# Patient Record
Sex: MALE | Race: WHITE | NOT HISPANIC OR LATINO
[De-identification: names, ages, dates, MRNs, and addresses within clinical notes are randomized per-mention and may not be internally consistent; named-entity substitution may affect disease eponyms.]

---

## 2018-03-27 ENCOUNTER — RESULT REVIEW (OUTPATIENT)
Age: 63
End: 2018-03-27

## 2018-05-22 ENCOUNTER — EMERGENCY (EMERGENCY)
Facility: HOSPITAL | Age: 63
LOS: 0 days | Discharge: ANOTHER HOSPITAL | End: 2018-05-23
Attending: EMERGENCY MEDICINE | Admitting: EMERGENCY MEDICINE
Payer: COMMERCIAL

## 2018-05-22 VITALS
SYSTOLIC BLOOD PRESSURE: 135 MMHG | TEMPERATURE: 98 F | HEART RATE: 86 BPM | DIASTOLIC BLOOD PRESSURE: 81 MMHG | OXYGEN SATURATION: 98 % | RESPIRATION RATE: 16 BRPM | WEIGHT: 162.92 LBS

## 2018-05-22 LAB
ALBUMIN SERPL ELPH-MCNC: 3.9 G/DL — SIGNIFICANT CHANGE UP (ref 3.3–5)
ALP SERPL-CCNC: 60 U/L — SIGNIFICANT CHANGE UP (ref 40–120)
ALT FLD-CCNC: 26 U/L — SIGNIFICANT CHANGE UP (ref 12–78)
ANION GAP SERPL CALC-SCNC: 8 MMOL/L — SIGNIFICANT CHANGE UP (ref 5–17)
APPEARANCE UR: CLEAR — SIGNIFICANT CHANGE UP
APTT BLD: 32.2 SEC — SIGNIFICANT CHANGE UP (ref 27.5–37.4)
AST SERPL-CCNC: 23 U/L — SIGNIFICANT CHANGE UP (ref 15–37)
BASOPHILS # BLD AUTO: 0.02 K/UL — SIGNIFICANT CHANGE UP (ref 0–0.2)
BASOPHILS NFR BLD AUTO: 0.3 % — SIGNIFICANT CHANGE UP (ref 0–2)
BILIRUB SERPL-MCNC: 0.4 MG/DL — SIGNIFICANT CHANGE UP (ref 0.2–1.2)
BILIRUB UR-MCNC: NEGATIVE — SIGNIFICANT CHANGE UP
BUN SERPL-MCNC: 16 MG/DL — SIGNIFICANT CHANGE UP (ref 7–23)
CALCIUM SERPL-MCNC: 8.6 MG/DL — SIGNIFICANT CHANGE UP (ref 8.5–10.1)
CHLORIDE SERPL-SCNC: 112 MMOL/L — HIGH (ref 96–108)
CO2 SERPL-SCNC: 23 MMOL/L — SIGNIFICANT CHANGE UP (ref 22–31)
COLOR SPEC: YELLOW — SIGNIFICANT CHANGE UP
CREAT SERPL-MCNC: 1.03 MG/DL — SIGNIFICANT CHANGE UP (ref 0.5–1.3)
DIFF PNL FLD: NEGATIVE — SIGNIFICANT CHANGE UP
EOSINOPHIL # BLD AUTO: 0.23 K/UL — SIGNIFICANT CHANGE UP (ref 0–0.5)
EOSINOPHIL NFR BLD AUTO: 3.3 % — SIGNIFICANT CHANGE UP (ref 0–6)
GLUCOSE SERPL-MCNC: 109 MG/DL — HIGH (ref 70–99)
GLUCOSE UR QL: NEGATIVE MG/DL — SIGNIFICANT CHANGE UP
HCT VFR BLD CALC: 43.7 % — SIGNIFICANT CHANGE UP (ref 39–50)
HGB BLD-MCNC: 14.9 G/DL — SIGNIFICANT CHANGE UP (ref 13–17)
IMM GRANULOCYTES NFR BLD AUTO: 0.3 % — SIGNIFICANT CHANGE UP (ref 0–1.5)
INR BLD: 1.12 RATIO — SIGNIFICANT CHANGE UP (ref 0.88–1.16)
KETONES UR-MCNC: NEGATIVE — SIGNIFICANT CHANGE UP
LEUKOCYTE ESTERASE UR-ACNC: (no result)
LIDOCAIN IGE QN: 106 U/L — SIGNIFICANT CHANGE UP (ref 73–393)
LYMPHOCYTES # BLD AUTO: 1.47 K/UL — SIGNIFICANT CHANGE UP (ref 1–3.3)
LYMPHOCYTES # BLD AUTO: 21.1 % — SIGNIFICANT CHANGE UP (ref 13–44)
MCHC RBC-ENTMCNC: 29.6 PG — SIGNIFICANT CHANGE UP (ref 27–34)
MCHC RBC-ENTMCNC: 34.1 GM/DL — SIGNIFICANT CHANGE UP (ref 32–36)
MCV RBC AUTO: 86.9 FL — SIGNIFICANT CHANGE UP (ref 80–100)
MONOCYTES # BLD AUTO: 0.58 K/UL — SIGNIFICANT CHANGE UP (ref 0–0.9)
MONOCYTES NFR BLD AUTO: 8.3 % — SIGNIFICANT CHANGE UP (ref 2–14)
NEUTROPHILS # BLD AUTO: 4.65 K/UL — SIGNIFICANT CHANGE UP (ref 1.8–7.4)
NEUTROPHILS NFR BLD AUTO: 66.7 % — SIGNIFICANT CHANGE UP (ref 43–77)
NITRITE UR-MCNC: NEGATIVE — SIGNIFICANT CHANGE UP
NRBC # BLD: 0 /100 WBCS — SIGNIFICANT CHANGE UP (ref 0–0)
PH UR: 7 — SIGNIFICANT CHANGE UP (ref 5–8)
PLATELET # BLD AUTO: 180 K/UL — SIGNIFICANT CHANGE UP (ref 150–400)
POTASSIUM SERPL-MCNC: 3.8 MMOL/L — SIGNIFICANT CHANGE UP (ref 3.5–5.3)
POTASSIUM SERPL-SCNC: 3.8 MMOL/L — SIGNIFICANT CHANGE UP (ref 3.5–5.3)
PROT SERPL-MCNC: 6.8 GM/DL — SIGNIFICANT CHANGE UP (ref 6–8.3)
PROT UR-MCNC: NEGATIVE MG/DL — SIGNIFICANT CHANGE UP
PROTHROM AB SERPL-ACNC: 12.1 SEC — SIGNIFICANT CHANGE UP (ref 9.8–12.7)
RBC # BLD: 5.03 M/UL — SIGNIFICANT CHANGE UP (ref 4.2–5.8)
RBC # FLD: 13.2 % — SIGNIFICANT CHANGE UP (ref 10.3–14.5)
RBC CASTS # UR COMP ASSIST: SIGNIFICANT CHANGE UP /HPF (ref 0–4)
SODIUM SERPL-SCNC: 143 MMOL/L — SIGNIFICANT CHANGE UP (ref 135–145)
SP GR SPEC: 1.01 — SIGNIFICANT CHANGE UP (ref 1.01–1.02)
UROBILINOGEN FLD QL: NEGATIVE MG/DL — SIGNIFICANT CHANGE UP
WBC # BLD: 6.97 K/UL — SIGNIFICANT CHANGE UP (ref 3.8–10.5)
WBC # FLD AUTO: 6.97 K/UL — SIGNIFICANT CHANGE UP (ref 3.8–10.5)
WBC UR QL: SIGNIFICANT CHANGE UP

## 2018-05-22 PROCEDURE — 99285 EMERGENCY DEPT VISIT HI MDM: CPT

## 2018-05-22 PROCEDURE — 99282 EMERGENCY DEPT VISIT SF MDM: CPT

## 2018-05-22 RX ORDER — SODIUM CHLORIDE 9 MG/ML
1000 INJECTION INTRAMUSCULAR; INTRAVENOUS; SUBCUTANEOUS ONCE
Qty: 0 | Refills: 0 | Status: DISCONTINUED | OUTPATIENT
Start: 2018-05-22 | End: 2018-05-23

## 2018-05-22 RX ORDER — ONDANSETRON 8 MG/1
4 TABLET, FILM COATED ORAL ONCE
Qty: 0 | Refills: 0 | Status: COMPLETED | OUTPATIENT
Start: 2018-05-22 | End: 2018-05-22

## 2018-05-22 RX ORDER — SODIUM CHLORIDE 9 MG/ML
1000 INJECTION INTRAMUSCULAR; INTRAVENOUS; SUBCUTANEOUS ONCE
Qty: 0 | Refills: 0 | Status: COMPLETED | OUTPATIENT
Start: 2018-05-22 | End: 2018-05-22

## 2018-05-22 RX ADMIN — ONDANSETRON 4 MILLIGRAM(S): 8 TABLET, FILM COATED ORAL at 19:24

## 2018-05-22 RX ADMIN — SODIUM CHLORIDE 1000 MILLILITER(S): 9 INJECTION INTRAMUSCULAR; INTRAVENOUS; SUBCUTANEOUS at 19:24

## 2018-05-22 NOTE — CONSULT NOTE ADULT - ASSESSMENT
A/P:  SBO/PSBO  S/P liver transplant at [facility]  Advise NPO  Pt requires tertiary care center surgical management not available at [facility]  Advise pt transfer to appropriate tertiary care center for definitive management and care, pt [facility] surgeon; advise transfer to appropriate tertiary care transplant center   All of the above relayed to pt and to ER staff

## 2018-05-22 NOTE — ED STATDOCS - PROGRESS NOTE DETAILS
Sign out from resident Francisco Javier:  She discussed case with Dr. Voss who is the on call surgeon today.  He is currently in surgery, awaiting call back.  Patient is aware he will be admitted. -Rika Su PA-C Outpatient CT done at [facility], official read brought in by patient but disc was left at home.  Impression:  Partial SBO with abnormal thick walled small bowel loop left lwoer quadrant pelvis.  Differential diagnosis includes ischemic bowl resulting in partial SBO.  Other diagnostic considerations include postoperative adhesions.  Neoplasm is less likely.  Clinical correlation is suggested.  Status post liver transplantation.  moderate prostatic enlargement Dr. Voss evaluated patient.  He recommends patient be transferred to [facility] where he had his surgery.  Message left with the answering service of Dr. Devlin for return call -Rika Su PA-C Transfer set up through [facility] transfer line by Dr. Frances.  The receiving physician at [facility] is Dr. Egan.   He is going to bed number 110. The reason for transfer is for care from transplant specialists.  Patient agreeable to transfer.  He took oral doses of his nighttime anti rejection medications that he brought from home here.  Tolerated.  Otherwise is on bowel rest without an NG tube in place.  Patient is awaiting transfer, comfortable and is stable -Rika Su PA-C

## 2018-05-22 NOTE — ED STATDOCS - OBJECTIVE STATEMENT
63 y/o Male with a PMHx of Liver transplant in 2011 due to EtOH presents to ED c/o abd pain x 2 days. +Nausea. Pt notes of very little BM recently, tried to treat with laxative with no relief of Sx. No blood in stool, no fever. Pt visited PMD who referred to [facility] for outpatient CT, pt was called and notified of partial SBO and told to go to ED. PMD Charan. [facility].

## 2018-05-22 NOTE — ED ADULT TRIAGE NOTE - CHIEF COMPLAINT QUOTE
pt sent to ED by Dr. jimenez for partial small bowel obstruction, pt c/o abdominal pain for two days, no NVD, no fever, no hx of SBO, pt has hx of liver transplant in 2011

## 2018-05-22 NOTE — CONSULT NOTE ADULT - SUBJECTIVE AND OBJECTIVE BOX
CC:Patient is a 62y old  Male who presents with a chief complaint of abd pain since 5/20/18    Subjective:  Pt seen and examined at bedside with chaperone. Pt is AAOx3, pt in no acute distress. Pt has c/o abd pain, central, non radiating sine 5/20/18.  Pt states scant flatus x 1 day. Pt denied c/o fever, chills, chest pain, SOB, N/V/D, extremity pain or dysfunction, hemoptysis, hematemesis, hematuria, hematochexia, headache, diplopia, vertigo, dizzyness. Pt s/p liver transplant at [facility] 2011.    ROS:  abd pain, otherwise negative    PMH: s/p liver transplant, h/o h/o etoh abuse in past, HTN  PSH: liver transplant, defibrillator  Allergies: NKDA  SH: no etoh, tobacco, illicit drug use  FH: father had CAD    Vital Signs Last 24 Hrs  T(C): 36.5 (22 May 2018 18:32), Max: 36.5 (22 May 2018 18:32)  T(F): 97.7 (22 May 2018 18:32), Max: 97.7 (22 May 2018 18:32)  HR: 86 (22 May 2018 18:32) (86 - 86)  BP: 135/81 (22 May 2018 18:32) (135/81 - 135/81)  BP(mean): --  RR: 16 (22 May 2018 18:32) (16 - 16)  SpO2: 98% (22 May 2018 18:32) (98% - 98%)    Labs:                      14.9   6.97  )-----------( 180      ( 22 May 2018 19:09 )             43.7     CBC Full  -  ( 22 May 2018 19:09 )  WBC Count : 6.97 K/uL  Hemoglobin : 14.9 g/dL  Hematocrit : 43.7 %  Platelet Count - Automated : 180 K/uL  Mean Cell Volume : 86.9 fl  Mean Cell Hemoglobin : 29.6 pg  Mean Cell Hemoglobin Concentration : 34.1 gm/dL  Auto Neutrophil # : 4.65 K/uL  Auto Lymphocyte # : 1.47 K/uL  Auto Monocyte # : 0.58 K/uL  Auto Eosinophil # : 0.23 K/uL  Auto Basophil # : 0.02 K/uL  Auto Neutrophil % : 66.7 %  Auto Lymphocyte % : 21.1 %  Auto Monocyte % : 8.3 %  Auto Eosinophil % : 3.3 %  Auto Basophil % : 0.3 %    05-22    143  |  112<H>  |  16  ----------------------------<  109<H>  3.8   |  23  |  1.03    Ca    8.6      22 May 2018 19:09    TPro  6.8  /  Alb  3.9  /  TBili  0.4  /  DBili  x   /  AST  23  /  ALT  26  /  AlkPhos  60  05-22    LIVER FUNCTIONS - ( 22 May 2018 19:09 )  Alb: 3.9 g/dL / Pro: 6.8 gm/dL / ALK PHOS: 60 U/L / ALT: 26 U/L / AST: 23 U/L / GGT: x           PT/INR - ( 22 May 2018 19:09 )   PT: 12.1 sec;   INR: 1.12 ratio         PTT - ( 22 May 2018 19:09 )  PTT:32.2 sec      Meds:      Radiology:  Outside radiologic study [facility], 5/22/18  CT abd/pelvis: Partial small bowel obstruction with abnormal thick walled small bowel loop left lower quadrant/pelvis. Differential diagnosis includes ischemic bowel resulting in partial small bowel obstruction. Other diagnostic considerations include postoperative adhesions. Neoplasm less likely. S/P liver transplantation. Moderate prostatic enlargement    Physical exam:  Pt is AAOx3  Pt in no acute distress  CNII-XII grossly intact   HEENT: Normocephalic, atraumatic, BASSEM, EOM wnl  Neck: No crepitus, no ecchymosis, no hematoma, to exam, no JVD, no tracheal deviation  Cardiovascular: S1S2 Present  Respiratory: Respiratory Effort normal; no wheezes, rales or rhonchi to exam, CTAB  ABD: bowel sounds (+), soft, (+) tenderness to exam of lower abdomen, no pain out of proportion to exam, mildly distended, no rebound, no guarding, no rigidity, no skin changes to exam. No pelvic instability to exam, no skin changes  Musculoskeletal: All digits are warm and well perfused. Pt demonstrates grossly intact sensoromotor function. Pt has good capillary refill to digits, no calf edema or tenderness to exam.  Skin: no jaundice or icteric sclera to exam b/l, no skin changes to exam

## 2018-05-22 NOTE — ED ADULT NURSE NOTE - OBJECTIVE STATEMENT
Pt presented to ED c/o abd pain. As per pt, pt had a sudden onset of right mid abd since Sunday which worsen. The pain comes in waves, sharp, nonradiating. C/O occasional nausea w/o vomiting. Been having BM but very small quantity as per pt. Hx Liver transplant in 2011. No longer drinks. Pt noted to have distended abd w/ tenderness to mid abd. + Bowel sound. Hyperactive.

## 2018-05-22 NOTE — ED STATDOCS - ATTENDING CONTRIBUTION TO CARE
I, Jose Frances, performed the initial face to face bedside interview with this patient regarding history of present illness, review of symptoms and relevant past medical, social and family history.  I completed an independent physical examination.  I was the initial provider who evaluated this patient. I have signed out the follow up of any pending tests (i.e. labs, radiological studies) to the ACP.  I have communicated the patient’s plan of care and disposition with the ACP.  The history, relevant review of systems, past medical and surgical history, medical decision making, and physical examination was documented by the scribe in my presence and I attest to the accuracy of the documentation.

## 2018-05-22 NOTE — ED PEDIATRIC NURSE REASSESSMENT NOTE - NS ED NURSE REASSESS COMMENT FT2
Patient to be transferred to [facility].  Report given to transfer center.  IVF NS infusing at this time.  VS stable as charted.  Patient is stable on his feet.  Will continue to monitor.

## 2018-05-22 NOTE — ED ADULT NURSE NOTE - CHPI ED SYMPTOMS NEG
no numbness/no tingling/no vomiting/no decreased eating/drinking/no chills/no dizziness/no fever/no weakness

## 2018-05-23 VITALS
OXYGEN SATURATION: 98 % | RESPIRATION RATE: 16 BRPM | DIASTOLIC BLOOD PRESSURE: 74 MMHG | TEMPERATURE: 98 F | HEART RATE: 74 BPM | SYSTOLIC BLOOD PRESSURE: 122 MMHG

## 2018-05-23 LAB
CULTURE RESULTS: NO GROWTH — SIGNIFICANT CHANGE UP
SPECIMEN SOURCE: SIGNIFICANT CHANGE UP

## 2018-05-23 NOTE — ED ADULT NURSE REASSESSMENT NOTE - NS ED NURSE REASSESS COMMENT FT1
Patient transferred at this time by [facility] to [facility] with 2 EMT's.  VS stable as charted at this time. Color pink, skin warm and dry.  Right forearm IV intact with no signs of redness swelling or infiltration noted.

## 2018-05-25 DIAGNOSIS — Z94.4 LIVER TRANSPLANT STATUS: ICD-10-CM

## 2018-05-25 DIAGNOSIS — K56.609 UNSPECIFIED INTESTINAL OBSTRUCTION, UNSPECIFIED AS TO PARTIAL VERSUS COMPLETE OBSTRUCTION: ICD-10-CM

## 2018-06-02 ENCOUNTER — EMERGENCY (EMERGENCY)
Facility: HOSPITAL | Age: 63
LOS: 0 days | Discharge: HOME | End: 2018-06-03
Attending: EMERGENCY MEDICINE | Admitting: EMERGENCY MEDICINE
Payer: MEDICARE

## 2018-06-02 VITALS
DIASTOLIC BLOOD PRESSURE: 98 MMHG | RESPIRATION RATE: 18 BRPM | HEIGHT: 70 IN | HEART RATE: 79 BPM | OXYGEN SATURATION: 95 % | TEMPERATURE: 97 F | SYSTOLIC BLOOD PRESSURE: 150 MMHG | WEIGHT: 162.92 LBS

## 2018-06-02 DIAGNOSIS — S01.551A OPEN BITE OF LIP, INITIAL ENCOUNTER: ICD-10-CM
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
## 2018-06-02 PROCEDURE — 99284 EMERGENCY DEPT VISIT MOD MDM: CPT

## 2018-06-02 NOTE — ED ADULT TRIAGE NOTE - CHIEF COMPLAINT QUOTE
pt was bit by dog on right side of face. Dog is owned by patient and is up to date on vaccinations. Pt had tetanus shot a few months ago

## 2018-06-03 RX ORDER — TACROLIMUS 5 MG/1
0 CAPSULE ORAL
Qty: 0 | Refills: 0 | COMMUNITY

## 2018-06-03 RX ADMIN — Medication 1 TABLET(S): at 01:09

## 2018-06-03 NOTE — ED PROVIDER NOTE - CARE PLAN
Principal Discharge DX:	Dog bite, initial encounter  Secondary Diagnosis:	Lip laceration, initial encounter

## 2018-06-03 NOTE — ED ADULT NURSE REASSESSMENT NOTE - NS ED NURSE REASSESS COMMENT FT1
patient discharged home. written and verbal discharge, prescription, and followup instructions given to patient, patient verbalized back understanding. discharged home with wife at 0310.

## 2018-06-03 NOTE — ED ADULT NURSE NOTE - OBJECTIVE STATEMENT
s/p dog bite by own dog (jackl deandra terrier) to right cheek. states dog's vaccinations utd. states tetanus utd

## 2018-06-03 NOTE — ED PROVIDER NOTE - PROGRESS NOTE DETAILS
case d/w Dr Leblanc (plastics) and will abran pt evaluated and sutured by Dr Leblanc and recommend augmentin and f/u in office

## 2018-06-03 NOTE — ED PROVIDER NOTE - OBJECTIVE STATEMENT
61 y/o male in ED with spouse c/o dog bite to face by his own dog x 1 hr.   pt states that he was attempting to move his dog on the dog bed when she awoke and bit him.  states lac to right upper lip.  pt denies any other complaints.  states last tetanus 2-3 months ago.  states dog UTD with shots.

## 2019-05-23 PROBLEM — Z94.4 LIVER TRANSPLANT STATUS: Chronic | Status: ACTIVE | Noted: 2018-05-23

## 2019-05-28 ENCOUNTER — TRANSCRIPTION ENCOUNTER (OUTPATIENT)
Age: 64
End: 2019-05-28

## 2019-05-29 ENCOUNTER — OUTPATIENT (OUTPATIENT)
Dept: OUTPATIENT SERVICES | Facility: HOSPITAL | Age: 64
LOS: 1 days | End: 2019-05-29
Payer: MEDICARE

## 2019-05-29 VITALS
TEMPERATURE: 98 F | WEIGHT: 156.09 LBS | DIASTOLIC BLOOD PRESSURE: 73 MMHG | HEIGHT: 68 IN | OXYGEN SATURATION: 97 % | HEART RATE: 75 BPM | RESPIRATION RATE: 17 BRPM | SYSTOLIC BLOOD PRESSURE: 135 MMHG

## 2019-05-29 VITALS
OXYGEN SATURATION: 97 % | SYSTOLIC BLOOD PRESSURE: 127 MMHG | RESPIRATION RATE: 14 BRPM | HEART RATE: 79 BPM | DIASTOLIC BLOOD PRESSURE: 87 MMHG

## 2019-05-29 DIAGNOSIS — Z98.890 OTHER SPECIFIED POSTPROCEDURAL STATES: Chronic | ICD-10-CM

## 2019-05-29 DIAGNOSIS — Z94.4 LIVER TRANSPLANT STATUS: Chronic | ICD-10-CM

## 2019-05-29 DIAGNOSIS — Z95.0 PRESENCE OF CARDIAC PACEMAKER: Chronic | ICD-10-CM

## 2019-05-29 DIAGNOSIS — H25.10 AGE-RELATED NUCLEAR CATARACT, UNSPECIFIED EYE: ICD-10-CM

## 2019-05-29 PROCEDURE — 66984 XCAPSL CTRC RMVL W/O ECP: CPT | Mod: RT

## 2019-05-29 PROCEDURE — V2632: CPT

## 2019-05-29 NOTE — ASU PATIENT PROFILE, ADULT - PMH
CHF (congestive heart failure)    Cirrhosis of liver    Hepatitis C    HLD (hyperlipidemia)    Liver transplant recipient    Syncope, vasovagal    Valvular heart disease    Ventricular tachycardia, paroxysmal

## 2019-05-29 NOTE — ASU DISCHARGE PLAN (ADULT/PEDIATRIC) - CALL YOUR DOCTOR IF YOU HAVE ANY OF THE FOLLOWING:
Swelling that gets worse/Fever greater than (need to indicate Fahrenheit or Celsius)/Nausea and vomiting that does not stop/Bleeding that does not stop/Pain not relieved by Medications

## 2019-05-29 NOTE — ASU DISCHARGE PLAN (ADULT/PEDIATRIC) - CARE PROVIDER_API CALL
Kevin Sims)  Ophthalmology  [address] [facility], [address] [place] [address]  Phone: [phone]  Fax: [phone]  Follow Up Time:

## 2019-05-29 NOTE — ASU DISCHARGE PLAN (ADULT/PEDIATRIC) - PATIENT EDUCATION MATERIALS PROVIED
Implant card (specify)/Intraocular lens implant (IOL) , Eye shield instructions and eye kit given to patient/Other (specify)

## 2019-05-29 NOTE — ASU PATIENT PROFILE, ADULT - PSH
H/O bilateral inguinal hernia repair    H/O liver transplant    H/O umbilical hernia repair    Pacemaker  Removal of pacemaker /AICD [id number] still in place

## 2019-05-29 NOTE — ASU DISCHARGE PLAN (ADULT/PEDIATRIC) - NURSING INSTRUCTIONS
Do not rub the eye. Tylenol or extra strength tylenol if needed for discomfort, avoid   Advil, Motrin, Aleve and aspirin to minimize bleeding. Appointment with Dr. Sims on 5/30/19

## 2019-06-10 PROBLEM — E78.5 HYPERLIPIDEMIA, UNSPECIFIED: Chronic | Status: ACTIVE | Noted: 2019-05-29

## 2019-06-10 PROBLEM — I38 ENDOCARDITIS, VALVE UNSPECIFIED: Chronic | Status: ACTIVE | Noted: 2019-05-29

## 2019-06-10 PROBLEM — B19.20 UNSPECIFIED VIRAL HEPATITIS C WITHOUT HEPATIC COMA: Chronic | Status: ACTIVE | Noted: 2019-05-29

## 2019-06-10 PROBLEM — K74.60 UNSPECIFIED CIRRHOSIS OF LIVER: Chronic | Status: ACTIVE | Noted: 2019-05-29

## 2019-06-10 PROBLEM — R55 SYNCOPE AND COLLAPSE: Chronic | Status: ACTIVE | Noted: 2019-05-29

## 2019-06-10 PROBLEM — I50.9 HEART FAILURE, UNSPECIFIED: Chronic | Status: ACTIVE | Noted: 2019-05-29

## 2019-06-10 PROBLEM — I47.2 VENTRICULAR TACHYCARDIA: Chronic | Status: ACTIVE | Noted: 2019-05-29

## 2019-06-11 ENCOUNTER — TRANSCRIPTION ENCOUNTER (OUTPATIENT)
Age: 64
End: 2019-06-11

## 2019-06-12 ENCOUNTER — OUTPATIENT (OUTPATIENT)
Dept: OUTPATIENT SERVICES | Facility: HOSPITAL | Age: 64
LOS: 1 days | End: 2019-06-12
Payer: MEDICARE

## 2019-06-12 VITALS
RESPIRATION RATE: 18 BRPM | OXYGEN SATURATION: 98 % | DIASTOLIC BLOOD PRESSURE: 76 MMHG | SYSTOLIC BLOOD PRESSURE: 118 MMHG | HEART RATE: 72 BPM

## 2019-06-12 VITALS
RESPIRATION RATE: 15 BRPM | OXYGEN SATURATION: 95 % | SYSTOLIC BLOOD PRESSURE: 123 MMHG | HEART RATE: 77 BPM | DIASTOLIC BLOOD PRESSURE: 84 MMHG | TEMPERATURE: 98 F | HEIGHT: 68 IN | WEIGHT: 154.32 LBS

## 2019-06-12 DIAGNOSIS — Z98.890 OTHER SPECIFIED POSTPROCEDURAL STATES: Chronic | ICD-10-CM

## 2019-06-12 DIAGNOSIS — H25.10 AGE-RELATED NUCLEAR CATARACT, UNSPECIFIED EYE: ICD-10-CM

## 2019-06-12 DIAGNOSIS — Z98.49 CATARACT EXTRACTION STATUS, UNSPECIFIED EYE: Chronic | ICD-10-CM

## 2019-06-12 DIAGNOSIS — Z95.0 PRESENCE OF CARDIAC PACEMAKER: Chronic | ICD-10-CM

## 2019-06-12 DIAGNOSIS — Z94.4 LIVER TRANSPLANT STATUS: Chronic | ICD-10-CM

## 2019-06-12 PROCEDURE — 66984 XCAPSL CTRC RMVL W/O ECP: CPT | Mod: LT

## 2019-06-12 PROCEDURE — V2632: CPT

## 2019-06-12 NOTE — ASU PATIENT PROFILE, ADULT - PSH
H/O bilateral inguinal hernia repair    H/O cataract removal with insertion of prosthetic lens  right eye  H/O liver transplant    H/O umbilical hernia repair    Pacemaker  Removal of pacemaker /AICD [id number] still in place

## 2019-06-12 NOTE — ASU DISCHARGE PLAN (ADULT/PEDIATRIC) - CALL YOUR DOCTOR IF YOU HAVE ANY OF THE FOLLOWING:
Swelling that gets worse/Nausea and vomiting that does not stop/Bleeding that does not stop/Pain not relieved by Medications/Wound/Surgical Site with redness, or foul smelling discharge or pus

## 2019-06-12 NOTE — ASU DISCHARGE PLAN (ADULT/PEDIATRIC) - CARE PROVIDER_API CALL
Kevin Sims)  Ophthalmology  [address] [facility], [address] [place] [address]  Phone: [phone]  Fax: [phone]  Follow Up Time:

## 2019-10-11 ENCOUNTER — APPOINTMENT (OUTPATIENT)
Dept: GASTROENTEROLOGY | Facility: CLINIC | Age: 64
End: 2019-10-11
Payer: MEDICARE

## 2019-10-11 VITALS
BODY MASS INDEX: 23.7 KG/M2 | WEIGHT: 160 LBS | DIASTOLIC BLOOD PRESSURE: 96 MMHG | HEART RATE: 83 BPM | SYSTOLIC BLOOD PRESSURE: 142 MMHG | HEIGHT: 69 IN

## 2019-10-11 DIAGNOSIS — Z87.891 PERSONAL HISTORY OF NICOTINE DEPENDENCE: ICD-10-CM

## 2019-10-11 DIAGNOSIS — R10.9 UNSPECIFIED ABDOMINAL PAIN: ICD-10-CM

## 2019-10-11 DIAGNOSIS — R63.4 ABNORMAL WEIGHT LOSS: ICD-10-CM

## 2019-10-11 PROCEDURE — 99213 OFFICE O/P EST LOW 20 MIN: CPT

## 2019-10-11 NOTE — PHYSICAL EXAM
[General Appearance - Alert] : alert [Sclera] : the sclera and conjunctiva were normal [PERRL With Normal Accommodation] : pupils were equal in size, round, and reactive to light [Extraocular Movements] : extraocular movements were intact [Heart Rate And Rhythm] : heart rate was normal and rhythm regular [Auscultation Breath Sounds / Voice Sounds] : lungs were clear to auscultation bilaterally [Heart Sounds] : normal S1 and S2 [Heart Sounds Gallop] : no gallops [Heart Sounds Pericardial Friction Rub] : no pericardial rub [Murmurs] : no murmurs [Abdomen Soft] : soft [Bowel Sounds] : normal bowel sounds [FreeTextEntry1] : mild epigastric tenderness.  [Abdomen Mass (___ Cm)] : no abdominal mass palpated [] : no hepato-splenomegaly [No CVA Tenderness] : no ~M costovertebral angle tenderness [No Spinal Tenderness] : no spinal tenderness [Abnormal Walk] : normal gait [Nail Clubbing] : no clubbing  or cyanosis of the fingernails [Musculoskeletal - Swelling] : no joint swelling seen [Motor Tone] : muscle strength and tone were normal [Impaired Insight] : insight and judgment were intact [Oriented To Time, Place, And Person] : oriented to person, place, and time [Affect] : the affect was normal

## 2019-10-11 NOTE — ASSESSMENT
[FreeTextEntry1] : 65 y/o male with hx of etoh cirrhosis s/p liver transplant in 2013 on prograft.  Now with epigastric pain and weight loss of unknown etiology and new diagnose of prostate cancer.  Obtain ct scan, plan for egd and start ppi. \par Discussed with Dr. Adler.

## 2019-10-11 NOTE — HISTORY OF PRESENT ILLNESS
[de-identified] : 63 y/o  male with hx of etoh cirrhosis s/p liver transplant in 2013 on prograft now with abdominal pain and weight loss over the past few weeks.  Pt reports the pain is epigastric and lasted for about 1 week and describes as a dullache.  Denies taken any antacids.  Now pain has improved but still losing weight.  Also, diagnosed with prostate cancer by Dr. Brown, now being followed by Dr. Chen with a psa of 3.4  Pt denies any n/v, melena, rectal bleeding, or changes in bowel habits.

## 2019-10-11 NOTE — REVIEW OF SYSTEMS
[Feeling Poorly] : feeling poorly [Recent Weight Loss (___ Lbs)] : recent [unfilled] ~Ulb weight loss [As Noted in HPI] : as noted in HPI [Abdominal Pain] : abdominal pain [Negative] : Heme/Lymph

## 2019-11-01 ENCOUNTER — APPOINTMENT (OUTPATIENT)
Dept: GASTROENTEROLOGY | Facility: AMBULATORY MEDICAL SERVICES | Age: 64
End: 2019-11-01
Payer: MEDICARE

## 2019-11-01 ENCOUNTER — RESULT REVIEW (OUTPATIENT)
Age: 64
End: 2019-11-01

## 2019-11-01 PROCEDURE — 43239 EGD BIOPSY SINGLE/MULTIPLE: CPT

## 2019-12-12 ENCOUNTER — APPOINTMENT (OUTPATIENT)
Dept: GASTROENTEROLOGY | Facility: CLINIC | Age: 64
End: 2019-12-12

## 2020-11-06 ENCOUNTER — APPOINTMENT (OUTPATIENT)
Dept: ELECTROPHYSIOLOGY | Facility: CLINIC | Age: 65
End: 2020-11-06
Payer: MEDICARE

## 2020-11-06 ENCOUNTER — NON-APPOINTMENT (OUTPATIENT)
Age: 65
End: 2020-11-06

## 2020-11-06 VITALS
RESPIRATION RATE: 16 BRPM | TEMPERATURE: 98.7 F | HEIGHT: 69 IN | OXYGEN SATURATION: 96 % | BODY MASS INDEX: 24.14 KG/M2 | WEIGHT: 163 LBS | DIASTOLIC BLOOD PRESSURE: 97 MMHG | SYSTOLIC BLOOD PRESSURE: 142 MMHG | HEART RATE: 97 BPM

## 2020-11-06 PROCEDURE — 93000 ELECTROCARDIOGRAM COMPLETE: CPT

## 2020-11-06 PROCEDURE — 99205 OFFICE O/P NEW HI 60 MIN: CPT

## 2020-11-06 RX ORDER — PANTOPRAZOLE 40 MG/1
40 TABLET, DELAYED RELEASE ORAL DAILY
Qty: 90 | Refills: 3 | Status: DISCONTINUED | COMMUNITY
Start: 2019-10-11 | End: 2020-11-06

## 2020-11-06 NOTE — HISTORY OF PRESENT ILLNESS
[FreeTextEntry1] : 65-year-old male with history of EtOH cirrhosis status post liver transplant 2013 on Prograf, htn,  frequent PVCs and NSVT. 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 \par \par Cardiac work-up:\par Holter monitor shows frequent PVC and NSVT 15% burden there is predominant monomorphic PVC same morphology as NSVT however there is some other rare PVC different morphology at least 2 different forms.

## 2020-11-06 NOTE — REASON FOR VISIT
[Initial Evaluation] : an initial evaluation of [Ventricular Tachycardia] : ventricular tachycardia [FreeTextEntry1] : NSVT PVC, ref Dr Farrar

## 2020-11-06 NOTE — PHYSICAL EXAM
[Normal Appearance] : normal appearance [Normal Conjunctiva] : the conjunctiva exhibited no abnormalities [Normal Oral Mucosa] : normal oral mucosa [Heart Rate And Rhythm] : heart rate and rhythm were normal [Heart Sounds] : normal S1 and S2 [Respiration, Rhythm And Depth] : normal respiratory rhythm and effort [Bowel Sounds] : normal bowel sounds [Skin Color & Pigmentation] : normal skin color and pigmentation [Oriented To Time, Place, And Person] : oriented to person, place, and time [Impaired Insight] : insight and judgment were intact

## 2020-11-06 NOTE — DISCUSSION/SUMMARY
[FreeTextEntry1] : 65-year-old male with history of EtOH cirrhosis status post liver transplant 2013 on Prograf, htn, frequent PVCs 15% and NSVT. PVC is at least 2 different morphology on ECG, RBBB, LV origin. \par he is asymptomatic i would start toprolol xl 25mg daily and follow in few months with holter monitor to evaluate for PVC supression. Pt adviced that if PVC >20% there is risk of PVC induced CMP so we should monitor closely if not suppressed may need ablation procedure or AAD

## 2021-05-10 ENCOUNTER — APPOINTMENT (OUTPATIENT)
Dept: ELECTROPHYSIOLOGY | Facility: CLINIC | Age: 66
End: 2021-05-10
Payer: MEDICARE

## 2021-05-10 ENCOUNTER — NON-APPOINTMENT (OUTPATIENT)
Age: 66
End: 2021-05-10

## 2021-05-10 VITALS
HEART RATE: 88 BPM | DIASTOLIC BLOOD PRESSURE: 92 MMHG | BODY MASS INDEX: 24.44 KG/M2 | HEIGHT: 69 IN | OXYGEN SATURATION: 98 % | SYSTOLIC BLOOD PRESSURE: 144 MMHG | WEIGHT: 165 LBS

## 2021-05-10 PROCEDURE — 99215 OFFICE O/P EST HI 40 MIN: CPT

## 2021-05-10 PROCEDURE — 93000 ELECTROCARDIOGRAM COMPLETE: CPT

## 2021-05-10 RX ORDER — METOPROLOL SUCCINATE 50 MG/1
50 TABLET, EXTENDED RELEASE ORAL
Qty: 90 | Refills: 3 | Status: ACTIVE | COMMUNITY
Start: 2021-05-10 | End: 1900-01-01

## 2021-05-10 NOTE — HISTORY OF PRESENT ILLNESS
[FreeTextEntry1] : 65-year-old male with history of EtOH cirrhosis status post liver transplant 2013 on Prograf, htn,  frequent PVCs and NSVT. 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 He is taking metoprolol 25mg qD for PVC suppression with still high burden PVC 15% asymptomatic, doing well. \par ECG shows SR 80 bpm, PVC discordance in III and II and aVr,AVL, possibly parahisian PVC\par \par Cardiac work-up:\par 4/2021 Holter 48h monitor shows predominant SR, frequent PVC sometimes in couplet 15% burden\par Holter monitor shows frequent PVC and NSVT 15% burden there is predominant monomorphic PVC same morphology as NSVT however there is some other rare PVC different morphology at least 2 different forms.

## 2021-05-10 NOTE — DISCUSSION/SUMMARY
[FreeTextEntry1] : 65-year-old male with history of EtOH cirrhosis status post liver transplant 2013 on Prograf, htn, frequent PVCs 15% and NSVT. PVC is at least 2 different morphology on ECG\par he is asymptomatic tolerating toprolol xl will increase to 50mg daily. \par Pt adviced that if PVC >20% there is risk of PVC induced CMP so we should monitor closely if not suppressed may need ablation procedure or AAD

## 2021-05-11 RX ORDER — CHLORHEXIDINE GLUCONATE, 0.12% ORAL RINSE 1.2 MG/ML
0.12 SOLUTION DENTAL
Qty: 473 | Refills: 0 | Status: ACTIVE | COMMUNITY
Start: 2020-12-10

## 2021-05-11 RX ORDER — IBUPROFEN 600 MG/1
600 TABLET, FILM COATED ORAL
Qty: 30 | Refills: 0 | Status: ACTIVE | COMMUNITY
Start: 2020-12-10

## 2021-05-11 RX ORDER — TACROLIMUS 1 MG/1
1 CAPSULE ORAL
Refills: 0 | Status: ACTIVE | COMMUNITY

## 2021-05-11 RX ORDER — HYDROCODONE BITARTRATE AND ACETAMINOPHEN 10; 325 MG/1; MG/1
10-325 TABLET ORAL
Qty: 20 | Refills: 0 | Status: ACTIVE | COMMUNITY
Start: 2020-12-10

## 2021-05-11 RX ORDER — ATORVASTATIN CALCIUM 10 MG/1
10 TABLET, FILM COATED ORAL
Qty: 30 | Refills: 0 | Status: ACTIVE | COMMUNITY
Start: 2021-02-02

## 2021-05-11 RX ORDER — AMLODIPINE BESYLATE 10 MG/1
10 TABLET ORAL
Qty: 90 | Refills: 0 | Status: ACTIVE | COMMUNITY
Start: 2020-05-22

## 2021-05-11 RX ORDER — AMOXICILLIN 500 MG/1
500 CAPSULE ORAL
Qty: 30 | Refills: 0 | Status: ACTIVE | COMMUNITY
Start: 2020-12-10

## 2021-11-08 ENCOUNTER — APPOINTMENT (OUTPATIENT)
Dept: ELECTROPHYSIOLOGY | Facility: CLINIC | Age: 66
End: 2021-11-08
Payer: MEDICARE

## 2021-11-08 ENCOUNTER — NON-APPOINTMENT (OUTPATIENT)
Age: 66
End: 2021-11-08

## 2021-11-08 VITALS
WEIGHT: 167.44 LBS | RESPIRATION RATE: 19 BRPM | OXYGEN SATURATION: 97 % | BODY MASS INDEX: 24.73 KG/M2 | HEART RATE: 80 BPM | DIASTOLIC BLOOD PRESSURE: 84 MMHG | SYSTOLIC BLOOD PRESSURE: 124 MMHG

## 2021-11-08 DIAGNOSIS — I49.3 VENTRICULAR PREMATURE DEPOLARIZATION: ICD-10-CM

## 2021-11-08 DIAGNOSIS — I47.2 VENTRICULAR TACHYCARDIA: ICD-10-CM

## 2021-11-08 PROCEDURE — 93000 ELECTROCARDIOGRAM COMPLETE: CPT

## 2021-11-08 PROCEDURE — 99214 OFFICE O/P EST MOD 30 MIN: CPT

## 2021-11-08 RX ORDER — METOPROLOL SUCCINATE 25 MG/1
25 TABLET, EXTENDED RELEASE ORAL
Qty: 90 | Refills: 3 | Status: DISCONTINUED | COMMUNITY
Start: 2020-11-06 | End: 2021-11-08

## 2021-11-08 NOTE — HISTORY OF PRESENT ILLNESS
[FreeTextEntry1] : 66-year-old male with history of EtOH cirrhosis status post liver transplant 2013 on Prograf, htn,  frequent PVCs and NSVT. 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 He is taking metoprolol 25mg qD for PVC suppression with still high burden PVC 15% asymptomatic, doing well. \par ECG 11.8.21 shows Sinus  Rhythm  -First degree A-V block, Thelma = 238, incomplete LBBB\par ECG shows SR 80 bpm, PVC discordance in III and II and aVr,AVL, possibly parahisian PVC. Pt denies any symptoms.\par \par \par Cardiac work-up:\par 4/2021 Holter 48h monitor shows predominant SR, frequent PVC sometimes in couplet 15% burden\par Holter monitor shows frequent PVC and NSVT 15% burden there is predominant monomorphic PVC same morphology as NSVT however there is some other rare PVC different morphology at least 2 different forms.

## 2021-11-08 NOTE — DISCUSSION/SUMMARY
[FreeTextEntry1] : 65-year-old male with history of EtOH cirrhosis status post liver transplant 2013 on Prograf, htn, frequent PVCs 15% and NSVT. PVC is at least 2 different morphology on ECG\par he is asymptomatic tolerating toprolol xl will increase to 50mg daily. \par Pt adviced that if PVC >20% there is risk of PVC induced CMP so we should monitor closely if not suppressed may need ablation procedure attempt, may not be able to ablate if close to HIS location, not a candidate for AAD due to mild HPC disease. \par \par Total length of time spent with this patient was 30 minutes and more then half of the time was spent face to face with the patient as well as counseling and coordination of care as stated above.\par

## 2021-12-07 NOTE — ED ADULT NURSE NOTE - FINAL NURSING ELECTRONIC SIGNATURE
"24 y.o.  EDC 19 EGA 20.3 her to LDA4 with Mother Andria c/o contractions and  \"feels like something is coming out\". Positive fetal movement, denies vaginal bleeding or LOF. Increased white discharge. T 99.3f /57 P 72     Pt was seen here on  with same c/o contractions. Transvag US showed cervical length 2.8 cm     Clean catch urine specimen dipped, WNL with trace blood. SVE ft/thick/unable to get all the way through cervix.   [place] showing some uterine irritability, Orally hydrating.   Report to Dr. Adorno, order for SQ terbutaline.   Terb given. PT reports decreased cramping. Discharge order received. PT educated t f/u with Dr. Campos next Wed for her scheduled appointment.   1550 discharged to home, ambulatory.           "
Detail Level: Detailed
23-May-2018 00:32

## 2022-04-18 ENCOUNTER — EMERGENCY (EMERGENCY)
Facility: HOSPITAL | Age: 67
LOS: 0 days | Discharge: HOME | End: 2022-04-18
Attending: EMERGENCY MEDICINE
Payer: MEDICARE

## 2022-04-18 VITALS
TEMPERATURE: 98 F | DIASTOLIC BLOOD PRESSURE: 94 MMHG | RESPIRATION RATE: 16 BRPM | OXYGEN SATURATION: 100 % | HEART RATE: 100 BPM | SYSTOLIC BLOOD PRESSURE: 138 MMHG

## 2022-04-18 VITALS — WEIGHT: 164.91 LBS | HEIGHT: 68 IN

## 2022-04-18 DIAGNOSIS — M79.89 OTHER SPECIFIED SOFT TISSUE DISORDERS: ICD-10-CM

## 2022-04-18 DIAGNOSIS — Z98.890 OTHER SPECIFIED POSTPROCEDURAL STATES: Chronic | ICD-10-CM

## 2022-04-18 DIAGNOSIS — E78.5 HYPERLIPIDEMIA, UNSPECIFIED: ICD-10-CM

## 2022-04-18 DIAGNOSIS — K74.60 UNSPECIFIED CIRRHOSIS OF LIVER: ICD-10-CM

## 2022-04-18 DIAGNOSIS — M70.21 OLECRANON BURSITIS, RIGHT ELBOW: ICD-10-CM

## 2022-04-18 DIAGNOSIS — Z98.49 CATARACT EXTRACTION STATUS, UNSPECIFIED EYE: Chronic | ICD-10-CM

## 2022-04-18 DIAGNOSIS — Z95.0 PRESENCE OF CARDIAC PACEMAKER: Chronic | ICD-10-CM

## 2022-04-18 DIAGNOSIS — M25.521 PAIN IN RIGHT ELBOW: ICD-10-CM

## 2022-04-18 DIAGNOSIS — I50.9 HEART FAILURE, UNSPECIFIED: ICD-10-CM

## 2022-04-18 DIAGNOSIS — Z94.4 LIVER TRANSPLANT STATUS: Chronic | ICD-10-CM

## 2022-04-18 PROCEDURE — 99282 EMERGENCY DEPT VISIT SF MDM: CPT

## 2022-04-18 NOTE — ED PROVIDER NOTE - OBJECTIVE STATEMENT
66M here with atraumatic right elbow pain/swelling today. ?triggered by overuse/lifting grandchild. No fever/chills. No abrasions/skin lesions. No weakness/-paresthesias. No hx same

## 2022-04-18 NOTE — ED PROVIDER NOTE - NSICDXPASTSURGICALHX_GEN_ALL_CORE_FT
PAST SURGICAL HISTORY:  H/O bilateral inguinal hernia repair     H/O cataract removal with insertion of prosthetic lens right eye    H/O liver transplant     H/O umbilical hernia repair     Pacemaker Removal of pacemaker /AICD [id number] still in place

## 2022-04-18 NOTE — ED PROVIDER NOTE - NSICDXPASTMEDICALHX_GEN_ALL_CORE_FT
PAST MEDICAL HISTORY:  CHF (congestive heart failure)     Cirrhosis of liver     Hepatitis C     HLD (hyperlipidemia)     Liver transplant recipient     Syncope, vasovagal     Valvular heart disease     Ventricular tachycardia, paroxysmal

## 2022-04-18 NOTE — ED PROVIDER NOTE - NSFOLLOWUPINSTRUCTIONS_ED_ALL_ED_FT
Follow-up with your regular physician and/or an orthopedist (Dr. To's information is provided)  Return with any persistent/worsening symptoms.   Ibuprofen (200mg) 2-3 pills every 6 hours to relieve pain/inflammation    Elbow Bursitis    Bursitis is swelling and pain at the tip of the elbow. This happens when fluid builds up in a sac under the skin (bursa). This may also be called olecranon bursitis.    What are the causes?  Elbow bursitis may be caused by:    Elbow injury, such as falling onto the elbow.  Leaning on hard surfaces for long periods of time.  Infection from an injury that breaks the skin near the elbow.  A bone growth (spur) that forms at the tip of the elbow.  A medical condition that causes inflammation, such as gout or rheumatoid arthritis.    Sometimes the cause is not known.    What are the signs or symptoms?  The first sign of elbow bursitis is usually swelling at the tip of the elbow. This can grow to be about the size of a golf ball. Swelling may start suddenly or develop gradually. Other symptoms may include:    Pain when bending or leaning on the elbow.  Not being able to move the elbow normally.    If bursitis is caused by an infection, you may have:    Redness, warmth, and tenderness of the elbow.  Drainage of pus from the swollen area over the elbow, if the skin breaks open.    How is this diagnosed?  This condition may be diagnosed based on:    Your symptoms and medical history.  Any recent injuries you have had.  A physical exam.  X-rays to check for a bone spur or fracture.  Draining fluid from the bursa to test it for infection.  Blood tests to rule out gout or rheumatoid arthritis.    How is this treated?  Treatment for elbow bursitis depends on the cause. Treatment may include:    Medicines. These may include:    Over-the-counter medicines to relieve pain and inflammation.  Antibiotic medicines.  Injections of anti-inflammatory medicines (steroids).  Draining fluid from the bursa.  Wrapping your elbow with a bandage.  Wearing elbow pads.    If these treatments do not help, you may need surgery to remove the bursa.    Follow these instructions at home:    Medicines    Take over-the-counter and prescription medicines only as told by your health care provider.  If you were prescribed an antibiotic medicine, take it as told by your health care provider. Do not stop taking the antibiotic even if you start to feel better.    Managing pain, stiffness, and swelling     If directed, put ice on your elbow:    Put ice in a plastic bag.  Place a towel between your skin and the bag.  Leave the ice on for 20 minutes, 2–3 times a day.  If your bursitis is caused by an injury, rest your elbow and wear your bandage as told by your health care provider.  Use elbow pads or elbow wraps to cushion your elbow as needed.    General instructions    Avoid any activities that cause elbow pain. Ask your health care provider what activities are safe for you.  Keep all follow-up visits as told by your health care provider. This is important.    Contact a health care provider if you have:  A fever.  Symptoms that do not get better with treatment.  Pain or swelling that:  Gets worse.  Goes away and then comes back.  Pus draining from your elbow.    Get help right away if you have:  Trouble moving your arm, hand, or fingers.    Summary  Elbow bursitis is inflammation of the fluid-filled sac (bursa) between the tip of your elbow bone (olecranon) and your skin.  Treatment for elbow bursitis depends on the cause. It may include medicines to relieve pain and inflammation, antibiotic medicines, and draining fluid from your elbow.  Contact a health care provider if your symptoms do not get better with treatment, or if your symptoms go away and then come back.    ADDITIONAL NOTES AND INSTRUCTIONS    Please follow up with your Primary MD in 24-48 hr.  Seek immediate medical care for any new/worsening signs or symptoms.

## 2022-04-18 NOTE — ED ADULT TRIAGE NOTE - CHIEF COMPLAINT QUOTE
patient ambulatory to ED c/o right elbow pain.  patient reports waking up around 4 AM today with right elbow pain, reports there is a fluid filled sac on elbow.  denies fever/chills.

## 2022-04-18 NOTE — ED PROVIDER NOTE - PATIENT PORTAL LINK FT
You can access the FollowMyHealth Patient Portal offered by [facility] by registering at the following website: [email or web address]. By joining [employer]’s FollowMyHealth portal, you will also be able to view your health information using other applications (apps) compatible with our system.

## 2022-04-18 NOTE — ED PROVIDER NOTE - CARE PROVIDER_API CALL
Ashish To)  Orthopaedic Surgery; Surgery of the Hand  [address] [place] [address]  Phone: [phone]  Fax: [phone]  Follow Up Time:

## 2022-04-18 NOTE — ED PROVIDER NOTE - CARDIOVASCULAR NEGATIVE STATEMENT, MLM
Dose Administered (Numbers Only): 0 Expiration Date (Optional): 7/1/22 Administered By (Optional): Stefani Stovall Route: Mckenna Jones Detail Level: Simple Hide Second Medication?: No Consent: The risks of the medication was reviewed with the patient. Total Volume Injected In Cc (Will Not Affected Billing): 3 Render J-Code Information In Note?: yes Units: cc Ndc # (Optional): [id number] Post-Care Instructions: I reviewed with the patient in detail post-care instructions. Patient understands to keep the injection sites clean and call the clinic if there is any redness, swelling or pain. Procedure Information: Please note that the numeric value listed in the Medication (1) and associated J-code units and Medication (2) and associated J-code units variables are j-code amounts and do not represent either the concentration or the total amount of the medications injected. I strongly recommend selecting no to the Render J-code information in note question. This will allow your note to be more clear. If you are billing j-codes with your injection codes you need to document the total amount of the medication injected. This amount should match the j-code units. For example, if you are injecting Triamcinolone 40mg as an intramuscular injection you would select 40 for the dose field and mg for the units. This would allow you to document  with 4 units (40mg = 10mg x 4). The total volume is not used to calculate j-codes only the amount of the medication administered. Lot # (Optional): -sk Medication (1) And Associated J-Code Units: Saline no chest pain and no edema.

## 2022-05-26 ENCOUNTER — APPOINTMENT (OUTPATIENT)
Dept: ELECTROPHYSIOLOGY | Facility: CLINIC | Age: 67
End: 2022-05-26

## 2022-11-25 ENCOUNTER — EMERGENCY (EMERGENCY)
Facility: HOSPITAL | Age: 67
LOS: 0 days | Discharge: HOME | End: 2022-11-25
Attending: EMERGENCY MEDICINE
Payer: MEDICARE

## 2022-11-25 VITALS
TEMPERATURE: 98 F | HEART RATE: 87 BPM | WEIGHT: 164.02 LBS | HEIGHT: 69 IN | OXYGEN SATURATION: 99 % | DIASTOLIC BLOOD PRESSURE: 87 MMHG | SYSTOLIC BLOOD PRESSURE: 151 MMHG | RESPIRATION RATE: 15 BRPM

## 2022-11-25 VITALS
SYSTOLIC BLOOD PRESSURE: 148 MMHG | DIASTOLIC BLOOD PRESSURE: 70 MMHG | HEART RATE: 79 BPM | OXYGEN SATURATION: 93 % | RESPIRATION RATE: 14 BRPM

## 2022-11-25 DIAGNOSIS — Z95.0 PRESENCE OF CARDIAC PACEMAKER: Chronic | ICD-10-CM

## 2022-11-25 DIAGNOSIS — E78.5 HYPERLIPIDEMIA, UNSPECIFIED: ICD-10-CM

## 2022-11-25 DIAGNOSIS — I38 ENDOCARDITIS, VALVE UNSPECIFIED: ICD-10-CM

## 2022-11-25 DIAGNOSIS — Z98.890 OTHER SPECIFIED POSTPROCEDURAL STATES: Chronic | ICD-10-CM

## 2022-11-25 DIAGNOSIS — M25.552 PAIN IN LEFT HIP: ICD-10-CM

## 2022-11-25 DIAGNOSIS — Z94.4 LIVER TRANSPLANT STATUS: Chronic | ICD-10-CM

## 2022-11-25 DIAGNOSIS — Z94.4 LIVER TRANSPLANT STATUS: ICD-10-CM
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
## 2022-11-25 DIAGNOSIS — R10.12 LEFT UPPER QUADRANT PAIN: ICD-10-CM

## 2022-11-25 DIAGNOSIS — K74.60 UNSPECIFIED CIRRHOSIS OF LIVER: ICD-10-CM

## 2022-11-25 DIAGNOSIS — S06.9X1A UNSPECIFIED INTRACRANIAL INJURY WITH LOSS OF CONSCIOUSNESS OF 30 MINUTES OR LESS, INITIAL ENCOUNTER: ICD-10-CM

## 2022-11-25 DIAGNOSIS — I11.0 HYPERTENSIVE HEART DISEASE WITH HEART FAILURE: ICD-10-CM

## 2022-11-25 DIAGNOSIS — S30.1XXA CONTUSION OF ABDOMINAL WALL, INITIAL ENCOUNTER: ICD-10-CM
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
## 2022-11-25 DIAGNOSIS — Z98.49 CATARACT EXTRACTION STATUS, UNSPECIFIED EYE: Chronic | ICD-10-CM

## 2022-11-25 DIAGNOSIS — Z87.19 PERSONAL HISTORY OF OTHER DISEASES OF THE DIGESTIVE SYSTEM: ICD-10-CM

## 2022-11-25 DIAGNOSIS — I50.9 HEART FAILURE, UNSPECIFIED: ICD-10-CM

## 2022-11-25 LAB
ALBUMIN SERPL ELPH-MCNC: 4.1 G/DL — SIGNIFICANT CHANGE UP (ref 3.3–5)
ALP SERPL-CCNC: 68 U/L — SIGNIFICANT CHANGE UP (ref 40–120)
ALT FLD-CCNC: 27 U/L — SIGNIFICANT CHANGE UP (ref 12–78)
ANION GAP SERPL CALC-SCNC: 5 MMOL/L — SIGNIFICANT CHANGE UP (ref 5–17)
APPEARANCE UR: CLEAR — SIGNIFICANT CHANGE UP
APTT BLD: 29.3 SEC — SIGNIFICANT CHANGE UP (ref 27.5–35.5)
AST SERPL-CCNC: 20 U/L — SIGNIFICANT CHANGE UP (ref 15–37)
BASOPHILS # BLD AUTO: 0.03 K/UL — SIGNIFICANT CHANGE UP (ref 0–0.2)
BASOPHILS NFR BLD AUTO: 0.3 % — SIGNIFICANT CHANGE UP (ref 0–2)
BILIRUB SERPL-MCNC: 0.4 MG/DL — SIGNIFICANT CHANGE UP (ref 0.2–1.2)
BILIRUB UR-MCNC: NEGATIVE — SIGNIFICANT CHANGE UP
BUN SERPL-MCNC: 21 MG/DL — SIGNIFICANT CHANGE UP (ref 7–23)
CALCIUM SERPL-MCNC: 8.8 MG/DL — SIGNIFICANT CHANGE UP (ref 8.5–10.1)
CHLORIDE SERPL-SCNC: 109 MMOL/L — HIGH (ref 96–108)
CO2 SERPL-SCNC: 25 MMOL/L — SIGNIFICANT CHANGE UP (ref 22–31)
COLOR SPEC: YELLOW — SIGNIFICANT CHANGE UP
CREAT SERPL-MCNC: 0.9 MG/DL — SIGNIFICANT CHANGE UP (ref 0.5–1.3)
DIFF PNL FLD: NEGATIVE — SIGNIFICANT CHANGE UP
EGFR: 92 ML/MIN/1.73M2 — SIGNIFICANT CHANGE UP
EOSINOPHIL # BLD AUTO: 0.11 K/UL — SIGNIFICANT CHANGE UP (ref 0–0.5)
EOSINOPHIL NFR BLD AUTO: 1.2 % — SIGNIFICANT CHANGE UP (ref 0–6)
GLUCOSE SERPL-MCNC: 110 MG/DL — HIGH (ref 70–99)
GLUCOSE UR QL: NEGATIVE — SIGNIFICANT CHANGE UP
HCT VFR BLD CALC: 45.8 % — SIGNIFICANT CHANGE UP (ref 39–50)
HGB BLD-MCNC: 15.7 G/DL — SIGNIFICANT CHANGE UP (ref 13–17)
IMM GRANULOCYTES NFR BLD AUTO: 0.4 % — SIGNIFICANT CHANGE UP (ref 0–0.9)
INR BLD: 1.08 RATIO — SIGNIFICANT CHANGE UP (ref 0.88–1.16)
KETONES UR-MCNC: NEGATIVE — SIGNIFICANT CHANGE UP
LEUKOCYTE ESTERASE UR-ACNC: NEGATIVE — SIGNIFICANT CHANGE UP
LIDOCAIN IGE QN: 124 U/L — SIGNIFICANT CHANGE UP (ref 73–393)
LYMPHOCYTES # BLD AUTO: 1.15 K/UL — SIGNIFICANT CHANGE UP (ref 1–3.3)
LYMPHOCYTES # BLD AUTO: 12.4 % — LOW (ref 13–44)
MCHC RBC-ENTMCNC: 30 PG — SIGNIFICANT CHANGE UP (ref 27–34)
MCHC RBC-ENTMCNC: 34.3 GM/DL — SIGNIFICANT CHANGE UP (ref 32–36)
MCV RBC AUTO: 87.4 FL — SIGNIFICANT CHANGE UP (ref 80–100)
MONOCYTES # BLD AUTO: 0.66 K/UL — SIGNIFICANT CHANGE UP (ref 0–0.9)
MONOCYTES NFR BLD AUTO: 7.1 % — SIGNIFICANT CHANGE UP (ref 2–14)
NEUTROPHILS # BLD AUTO: 7.27 K/UL — SIGNIFICANT CHANGE UP (ref 1.8–7.4)
NEUTROPHILS NFR BLD AUTO: 78.6 % — HIGH (ref 43–77)
NITRITE UR-MCNC: NEGATIVE — SIGNIFICANT CHANGE UP
PH UR: 6.5 — SIGNIFICANT CHANGE UP (ref 5–8)
PLATELET # BLD AUTO: 180 K/UL — SIGNIFICANT CHANGE UP (ref 150–400)
POTASSIUM SERPL-MCNC: 3.5 MMOL/L — SIGNIFICANT CHANGE UP (ref 3.5–5.3)
POTASSIUM SERPL-SCNC: 3.5 MMOL/L — SIGNIFICANT CHANGE UP (ref 3.5–5.3)
PROT SERPL-MCNC: 7.1 GM/DL — SIGNIFICANT CHANGE UP (ref 6–8.3)
PROT UR-MCNC: NEGATIVE — SIGNIFICANT CHANGE UP
PROTHROM AB SERPL-ACNC: 12.5 SEC — SIGNIFICANT CHANGE UP (ref 10.5–13.4)
RBC # BLD: 5.24 M/UL — SIGNIFICANT CHANGE UP (ref 4.2–5.8)
RBC # FLD: 13.5 % — SIGNIFICANT CHANGE UP (ref 10.3–14.5)
SODIUM SERPL-SCNC: 139 MMOL/L — SIGNIFICANT CHANGE UP (ref 135–145)
SP GR SPEC: 1.01 — SIGNIFICANT CHANGE UP (ref 1.01–1.02)
TROPONIN I, HIGH SENSITIVITY RESULT: 10.27 NG/L — SIGNIFICANT CHANGE UP
UROBILINOGEN FLD QL: NEGATIVE — SIGNIFICANT CHANGE UP
WBC # BLD: 9.26 K/UL — SIGNIFICANT CHANGE UP (ref 3.8–10.5)
WBC # FLD AUTO: 9.26 K/UL — SIGNIFICANT CHANGE UP (ref 3.8–10.5)

## 2022-11-25 PROCEDURE — 72125 CT NECK SPINE W/O DYE: CPT | Mod: 26,MA

## 2022-11-25 PROCEDURE — 36415 COLL VENOUS BLD VENIPUNCTURE: CPT

## 2022-11-25 PROCEDURE — 83690 ASSAY OF LIPASE: CPT

## 2022-11-25 PROCEDURE — 71260 CT THORAX DX C+: CPT | Mod: 26,MA

## 2022-11-25 PROCEDURE — 71045 X-RAY EXAM CHEST 1 VIEW: CPT | Mod: 26

## 2022-11-25 PROCEDURE — 86901 BLOOD TYPING SEROLOGIC RH(D): CPT

## 2022-11-25 PROCEDURE — 85730 THROMBOPLASTIN TIME PARTIAL: CPT

## 2022-11-25 PROCEDURE — 72170 X-RAY EXAM OF PELVIS: CPT

## 2022-11-25 PROCEDURE — 72170 X-RAY EXAM OF PELVIS: CPT | Mod: 26

## 2022-11-25 PROCEDURE — 72125 CT NECK SPINE W/O DYE: CPT | Mod: MA

## 2022-11-25 PROCEDURE — 93010 ELECTROCARDIOGRAM REPORT: CPT

## 2022-11-25 PROCEDURE — 81003 URINALYSIS AUTO W/O SCOPE: CPT

## 2022-11-25 PROCEDURE — 99285 EMERGENCY DEPT VISIT HI MDM: CPT | Mod: 25

## 2022-11-25 PROCEDURE — 93005 ELECTROCARDIOGRAM TRACING: CPT

## 2022-11-25 PROCEDURE — 70450 CT HEAD/BRAIN W/O DYE: CPT | Mod: 26,MA

## 2022-11-25 PROCEDURE — 86900 BLOOD TYPING SEROLOGIC ABO: CPT

## 2022-11-25 PROCEDURE — 96376 TX/PRO/DX INJ SAME DRUG ADON: CPT

## 2022-11-25 PROCEDURE — 96374 THER/PROPH/DIAG INJ IV PUSH: CPT | Mod: XU

## 2022-11-25 PROCEDURE — 71045 X-RAY EXAM CHEST 1 VIEW: CPT

## 2022-11-25 PROCEDURE — 99285 EMERGENCY DEPT VISIT HI MDM: CPT

## 2022-11-25 PROCEDURE — 71260 CT THORAX DX C+: CPT | Mod: MA

## 2022-11-25 PROCEDURE — 80053 COMPREHEN METABOLIC PANEL: CPT

## 2022-11-25 PROCEDURE — 70450 CT HEAD/BRAIN W/O DYE: CPT | Mod: MA

## 2022-11-25 PROCEDURE — 85610 PROTHROMBIN TIME: CPT

## 2022-11-25 PROCEDURE — 85025 COMPLETE CBC W/AUTO DIFF WBC: CPT

## 2022-11-25 PROCEDURE — 74177 CT ABD & PELVIS W/CONTRAST: CPT | Mod: MA

## 2022-11-25 PROCEDURE — 86850 RBC ANTIBODY SCREEN: CPT

## 2022-11-25 PROCEDURE — 84484 ASSAY OF TROPONIN QUANT: CPT

## 2022-11-25 PROCEDURE — 74177 CT ABD & PELVIS W/CONTRAST: CPT | Mod: 26,MA

## 2022-11-25 RX ORDER — OXYCODONE AND ACETAMINOPHEN 5; 325 MG/1; MG/1
1 TABLET ORAL
Qty: 15 | Refills: 0
Start: 2022-11-25 | End: 2022-11-27

## 2022-11-25 RX ORDER — MORPHINE SULFATE 50 MG/1
4 CAPSULE, EXTENDED RELEASE ORAL ONCE
Refills: 0 | Status: DISCONTINUED | OUTPATIENT
Start: 2022-11-25 | End: 2022-11-25

## 2022-11-25 RX ORDER — SODIUM CHLORIDE 9 MG/ML
250 INJECTION INTRAMUSCULAR; INTRAVENOUS; SUBCUTANEOUS ONCE
Refills: 0 | Status: COMPLETED | OUTPATIENT
Start: 2022-11-25 | End: 2022-11-25

## 2022-11-25 RX ADMIN — MORPHINE SULFATE 4 MILLIGRAM(S): 50 CAPSULE, EXTENDED RELEASE ORAL at 20:19

## 2022-11-25 RX ADMIN — SODIUM CHLORIDE 250 MILLILITER(S): 9 INJECTION INTRAMUSCULAR; INTRAVENOUS; SUBCUTANEOUS at 19:04

## 2022-11-25 RX ADMIN — MORPHINE SULFATE 4 MILLIGRAM(S): 50 CAPSULE, EXTENDED RELEASE ORAL at 19:04

## 2022-11-25 NOTE — ED PROVIDER NOTE - CLINICAL SUMMARY MEDICAL DECISION MAKING FREE TEXT BOX
Fall down 8 steps with primary c/o left lower flank pain. No blood thinners. Plan for full trauma scan, pain control, EKG, labs, reassess.

## 2022-11-25 NOTE — ED PROVIDER NOTE - NS ED ROS FT
Constitutional: No fevers, chills, or sweats.  Cardiac: No chest pain, exertional dyspnea, orthopnea  Respiratory: No shortness of breath, no cough  GI: No abdominal pain, no N/V/D  Neuro: No headaches, no neck pain/stiffness, no numbness  MSK: +Hip pain, flank pain.  All other systems reviewed and are negative unless otherwise stated in the HPI.

## 2022-11-25 NOTE — ED PROVIDER NOTE - OBJECTIVE STATEMENT
68 y/o male with PMHx of HTN on Amlodipine, HLD, liver transplant, CHF not on medication, hepatitis C presents to the ED s/p fall down 8 steps into his basement. He stepped on a bottle that was underneath a pile of clothes, fell onto his rear, and slid down the stairs. He endorses head strike and half a second of LOC. Patient complains of left flank and hip pain. Denies allergies to medications.

## 2022-11-25 NOTE — ED ADULT NURSE NOTE - OBJECTIVE STATEMENT
Pt states he was going down basement stairs and long-term down he tripped on some clothes and landed on L hip, possible LOC, states he did hit the back of his head, no bleeding or obvious trauma noted, pain w/ movement and palpation to L hip, no step-off or shortening noted. Hx liver transplant and CHF, not on blood thinners

## 2022-11-25 NOTE — ED PROVIDER NOTE - NSFOLLOWUPINSTRUCTIONS_ED_ALL_ED_FT
Return to the Emergency Department for worsening or persistent symptoms, and/or ANY NEW OR CONCERNING SYMPTOMS. If you have issues obtaining follow up, please call: [phone] or [phone]  to obtain a doctor or specialist who takes your insurance in your area.    Fall Prevention    WHAT YOU NEED TO KNOW:    Fall prevention includes ways to make your home and other areas safer. It also includes ways you can move more carefully to prevent a fall. Health conditions that cause changes in your blood pressure, vision, or muscle strength and coordination may increase your risk for falls. Medicines may also increase your risk for falls if they make you dizzy, weak, or sleepy.     DISCHARGE INSTRUCTIONS:    Call 911 or have someone else call if:     You have fallen and are unconscious.      You have fallen and cannot move part of your body.    Contact your healthcare provider if:     You have fallen and have pain or a headache.      You have questions or concerns about your condition or care.    Fall prevention tips:     Stand or sit up slowly. This may help you keep your balance and prevent falls.      Use assistive devices as directed. Your healthcare provider may suggest that you use a cane or walker to help you keep your balance. You may need to have grab bars put in your bathroom near the toilet or in the shower.      Wear shoes that fit well and have soles that . Wear shoes both inside and outside. Use slippers with good . Do not wear shoes with high heels.      Wear a personal alarm. This is a device that allows you to call 911 if you fall and need help. Ask your healthcare provider for more information.      Stay active. Exercise can help strengthen your muscles and improve your balance. Your healthcare provider may recommend water aerobics or walking. He or she may also recommend physical therapy to improve your coordination. Never start an exercise program without talking to your healthcare provider first.       Manage your medical conditions. Keep all appointments with your healthcare providers. Visit your eye doctor as directed.           Home safety tips:     Add items to prevent falls in the bathroom. Put nonslip strips on your bath or shower floor to prevent you from slipping. Use a bath mat if you do not have carpet in the bathroom. This will prevent you from falling when you step out of the bath or shower. Use a shower seat so you do not need to stand while you shower. Sit on the toilet or a chair in your bathroom to dry yourself and put on clothing. This will prevent you from losing your balance from drying or dressing yourself while you are standing.       Keep paths clear. Remove books, shoes, and other objects from walkways and stairs. Place cords for telephones and lamps out of the way so that you do not need to walk over them. Tape them down if you cannot move them. Remove small rugs. If you cannot remove a rug, secure it with double-sided tape. This will prevent you from tripping.       Install bright lights in your home. Use night lights to help light paths to the bathroom or kitchen. Always turn on the light before you start walking.      Keep items you use often on shelves within reach. Do not use a step stool to help you reach an item.      Paint or place reflective tape on the edges of your stairs. This will help you see the stairs better.    Follow up with your healthcare provider as directed: Write down your questions so you remember to ask them during your visits.

## 2022-11-25 NOTE — ED PROVIDER NOTE - NSICDXPASTSURGICALHX_GEN_ALL_CORE_FT
PAST SURGICAL HISTORY:  H/O bilateral inguinal hernia repair     H/O cataract removal with insertion of prosthetic lens right eye    H/O liver transplant     H/O umbilical hernia repair     Pacemaker Removal of pacemaker /AICD [id number] still in place

## 2022-11-25 NOTE — ED PROVIDER NOTE - PATIENT PORTAL LINK FT
You can access the FollowMyHealth Patient Portal offered by [facility] by registering at the following website: [email or web address]. By joining [employer]’s FollowMyHealth portal, you will also be able to view your health information using other applications (apps) compatible with our system.

## 2022-11-25 NOTE — ED ADULT NURSE NOTE - NSIMPLEMENTINTERV_GEN_ALL_ED
Implemented All Fall Risk Interventions:  [place] to call system. Call bell, personal items and telephone within reach. Instruct patient to call for assistance. Room bathroom lighting operational. Non-slip footwear when patient is off stretcher. Physically safe environment: no spills, clutter or unnecessary equipment. Stretcher in lowest position, wheels locked, appropriate side rails in place. Provide visual cue, wrist band, yellow gown, etc. Monitor gait and stability. Monitor for mental status changes and reorient to person, place, and time. Review medications for side effects contributing to fall risk. Reinforce activity limits and safety measures with patient and family.

## 2022-11-25 NOTE — ED PROVIDER NOTE - PROGRESS NOTE DETAILS
CT scans negative for acute traumatic injury.  X-rays reviewed myself are negative.  CT showed evidence of chronic hepatic congestion which is a known comorbidity.  After pain control, patient is ambulatory with steady gait, pain is much improved.  Stable for discharge.  Return precautions discussed.

## 2022-11-25 NOTE — ED PROVIDER NOTE - PHYSICAL EXAMINATION
General: AAOx3, NAD  HEENT: NCAT. Neck and hard cervical collar. Minimal cervical TTP.   Cardiac: Normal rate and rhythm, no murmurs, normal peripheral perfusion  Respiratory: Normal rate and effort. CTAB  GI: Soft, nondistended. Moderate left lower flank TTP. No ecchymosis. Otherwise nontender abd.   Neuro: No focal deficits. HERNANDEZ equally x4, sensation to light touch intact throughout  MSK: FROMx4, no peripheral edema.   Skin: No rash

## 2022-12-05 ENCOUNTER — APPOINTMENT (OUTPATIENT)
Dept: ORTHOPEDIC SURGERY | Facility: CLINIC | Age: 67
End: 2022-12-05

## 2023-05-30 ENCOUNTER — APPOINTMENT (OUTPATIENT)
Dept: GASTROENTEROLOGY | Facility: CLINIC | Age: 68
End: 2023-05-30
Payer: MEDICARE

## 2023-05-30 VITALS
SYSTOLIC BLOOD PRESSURE: 120 MMHG | HEART RATE: 69 BPM | BODY MASS INDEX: 24.29 KG/M2 | DIASTOLIC BLOOD PRESSURE: 74 MMHG | HEIGHT: 69 IN | WEIGHT: 164 LBS

## 2023-05-30 DIAGNOSIS — R19.8 OTHER SPECIFIED SYMPTOMS AND SIGNS INVOLVING THE DIGESTIVE SYSTEM AND ABDOMEN: ICD-10-CM

## 2023-05-30 DIAGNOSIS — Z94.4 LIVER TRANSPLANT STATUS: ICD-10-CM

## 2023-05-30 DIAGNOSIS — Z86.010 PERSONAL HISTORY OF COLONIC POLYPS: ICD-10-CM

## 2023-05-30 PROCEDURE — 99203 OFFICE O/P NEW LOW 30 MIN: CPT

## 2023-05-30 NOTE — ASSESSMENT
[FreeTextEntry1] : 66yo male with hx colon polyps\par \par Will check colonoscopy with miralax gatorade prep\par Risks and benefits of procedure(s) discussed with patient in detail, including but not limited to, perforation, bleeding, reaction to anesthesia, missed lesions.\par \par Will add supplemental fiber to see if decreased leakage\par

## 2023-05-30 NOTE — PHYSICAL EXAM

## 2023-05-30 NOTE — HISTORY OF PRESENT ILLNESS
[FreeTextEntry1] : 68yo male with hx colon polyps\par \par Patient with hx colon polyps on prior colonoscopy and due for surveillance colonoscopy\par Patient is asymptomatic without bleeding \par He does note increased leakage of stool, small amount\par no diarrhea\par Initially thought due to ensure but has persisted after stopping\par

## 2023-06-15 ENCOUNTER — APPOINTMENT (OUTPATIENT)
Dept: UROLOGY | Facility: CLINIC | Age: 68
End: 2023-06-15
Payer: MEDICARE

## 2023-06-15 VITALS
SYSTOLIC BLOOD PRESSURE: 130 MMHG | BODY MASS INDEX: 24.29 KG/M2 | HEIGHT: 69 IN | DIASTOLIC BLOOD PRESSURE: 72 MMHG | WEIGHT: 164 LBS

## 2023-06-15 PROCEDURE — 99203 OFFICE O/P NEW LOW 30 MIN: CPT

## 2023-06-15 NOTE — ASSESSMENT
[FreeTextEntry1] : 68 yo M with elevated PSA. Unclear if he had hx of prostate state cancer, possible GG1?\par WIll request records from Dr Doyle office. If he did have +bx will send for MRI. If 4k score is elevated, will send for MRI.

## 2023-06-15 NOTE — HISTORY OF PRESENT ILLNESS
[FreeTextEntry1] : 67 year old man seen 06/15/2023 with complaint of elevated PSA. Pt has seen multiple urologists. His most recent urologist reportedly recommended MRI for PSA rise from 5 to 5.3. Pt had AICD/pacemaker recently removed and is scheduled for leads to be removed so he can have MRIs. Of note, he had prostate biopsy by Dr Doyle a few years ago. Pt is not sure of results, but says there was "a little bit" of cancer, treatment was not recommended at that time. He denies bothersome LUTS. \par No hematuria, no dysuria, no frequency, no urgency, no hesitancy, no straining. No incontinence. \par No fevers, no chills, no nausea, no vomiting, no flank pain.

## 2023-07-05 ENCOUNTER — NON-APPOINTMENT (OUTPATIENT)
Age: 68
End: 2023-07-05

## 2023-07-16 ENCOUNTER — RESULT REVIEW (OUTPATIENT)
Age: 68
End: 2023-07-16

## 2023-07-16 ENCOUNTER — APPOINTMENT (OUTPATIENT)
Dept: MRI IMAGING | Facility: CLINIC | Age: 68
End: 2023-07-16
Payer: MEDICARE

## 2023-07-16 ENCOUNTER — OUTPATIENT (OUTPATIENT)
Dept: OUTPATIENT SERVICES | Facility: HOSPITAL | Age: 68
LOS: 1 days | End: 2023-07-16
Payer: MEDICARE

## 2023-07-16 DIAGNOSIS — Z94.4 LIVER TRANSPLANT STATUS: Chronic | ICD-10-CM

## 2023-07-16 DIAGNOSIS — Z95.0 PRESENCE OF CARDIAC PACEMAKER: Chronic | ICD-10-CM

## 2023-07-16 DIAGNOSIS — Z98.49 CATARACT EXTRACTION STATUS, UNSPECIFIED EYE: Chronic | ICD-10-CM

## 2023-07-16 DIAGNOSIS — Z98.890 OTHER SPECIFIED POSTPROCEDURAL STATES: Chronic | ICD-10-CM

## 2023-07-16 DIAGNOSIS — C61 MALIGNANT NEOPLASM OF PROSTATE: ICD-10-CM

## 2023-07-16 PROCEDURE — 72197 MRI PELVIS W/O & W/DYE: CPT | Mod: 26,MH

## 2023-07-16 PROCEDURE — 76498 UNLISTED MR PROCEDURE: CPT

## 2023-07-16 PROCEDURE — A9585: CPT

## 2023-07-16 PROCEDURE — 72197 MRI PELVIS W/O & W/DYE: CPT

## 2023-07-16 PROCEDURE — 76498P: CUSTOM | Mod: 26,MH

## 2023-08-15 ENCOUNTER — LABORATORY RESULT (OUTPATIENT)
Age: 68
End: 2023-08-15

## 2023-08-30 ENCOUNTER — APPOINTMENT (OUTPATIENT)
Dept: GASTROENTEROLOGY | Facility: AMBULATORY MEDICAL SERVICES | Age: 68
End: 2023-08-30
Payer: MEDICARE

## 2023-08-30 PROCEDURE — 45378 DIAGNOSTIC COLONOSCOPY: CPT | Mod: GC

## 2023-10-10 NOTE — ED STATDOCS - NS_EDPROVIDERDISPOUSERTYPE_ED_A_ED
Scribe Attestation (For Scribes USE Only)... Attending Attestation (For Attendings USE Only).../Scribe Attestation (For Scribes USE Only)... Lip Wedge Excision Repair Text: Given the location of the defect and the proximity to free margins a full thickness wedge repair was deemed most appropriate. Using a sterile surgical marker, the appropriate repair was drawn incorporating the defect and placing the expected incisions perpendicular to the vermilion border.  The vermilion border was also meticulously outlined to ensure appropriate reapproximation during the repair.  The area thus outlined was incised through and through with a #15 scalpel blade.  The muscularis and dermis were reaproximated with deep sutures following hemostasis. Care was taken to realign the vermilion border before proceeding with the superficial closure.  Once the vermilion was realigned the superfical and mucosal closure was finished.

## 2023-12-26 DIAGNOSIS — R97.20 ELEVATED PROSTATE, SPECIFIC ANTIGEN [PSA]: ICD-10-CM

## 2024-04-09 NOTE — ED ADULT NURSE NOTE - NSFALLRSKINDICATORS_ED_ALL_ED
[FreeTextEntry1] : 55 yo female with chronic low back pain, right lower extremity pain into foot with history of lumbar laminectomy in 2022.  She will continue pain management for now and return in 6 months for evaluation.   PLAN: Pain management - continue Dr. Mary Zambrano RTO 6 months 
no

## 2024-04-19 NOTE — ED PROVIDER NOTE - IV ALTEPLASE ADMIN OUTSIDE HIDDEN
ED TO INPATIENT SBAR HANDOFF    Patient Name: Evens De Leon   : 1980  43 y.o.   Family/Caregiver Present: No  Code Status Order: Full Code    C-SSRS:    Sitter Yes  Restraints:         Situation  Chief Complaint:   Chief Complaint   Patient presents with    Altered Mental Status     Pt arrives via EMS from [facility]. Pt is there for alcohol detox. They initially called for combative behavior. Pt was given by [facility], 6mg ativan po and 2.5haldol po. In hopes of pt \"sleeping it off\". Pt hallucinations increased and is currently altered. EMS denies any combative behavior with them.     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 Upon admission there his alcohol was 0.18, and tested positive for meth and pot. During pt stay here, he remained experiencing hallucinations, along with increasingly agitated and combative.Pt wound up being restrained and intubated.    Mental Status:  intubated  Arrived from: detox    Imaging:   XR CHEST PORTABLE   Final Result   Impression:  Endotracheal tube placement.           ______________________________________    Electronically signed by: LUPILLO SNELL M.D.   Date:     2024   Time:    18:04       XR CHEST PORTABLE   Final Result   Cardiomediastinal contours appear stable.  Basilar interstitial opacitis may relate to atelectasis or pneumonitis.  There is no focal pulmonary consolidation.  No pleural effusion or pneumothorax.           ______________________________________    Electronically signed by: YUAN MUELLER M.D.   Date:     2024   Time:    14:07       CT HEAD WO CONTRAST    (Results Pending)   XR CHEST PORTABLE    (Results Pending)   XR CHEST PORTABLE    (Results Pending)     COVID-19 Results:   Internal Administration   First Dose      Second Dose           Last COVID Lab SARS-CoV-2, PCR (no units)   Date Value   2021 Not Detected  show

## 2024-04-29 NOTE — ED ADULT NURSE NOTE - CAS DISCH CONDITION
[facility]  Report of Psychiatric Progress Note    Meli Antonio Patient Status:  Inpatient    11/3/1952 MRN [id number]   Location [facility] 4NW-A Attending Juan C Suazo,    Hosp Day # 8 PCP SIRI TABOR     Date of Admission: 2024  Date of Service: 2024  Reason for Consultation: Severe depression    Impression:  Primary Psychiatric Diagnosis:  Major depressive disorder, recurrent, severe, with vegetative symptoms. She has depressed mood, guilty worthless feelings, hopelessness, decreased motivation and apathy, poor concentration, and low appetite. PO intake improving. She ate 50% of breakfast and 65% of lunch today.     Recent mixed catatonia. She had mutism and excitatory motor movements- resolved.    Possible recent serotonin syndrome. Lyrica was stopped, Lexapro decreased, and she was given benzos at [place]. Resolved.     Acute delirium/encephalopathy due to SUPA and hypernatremia. Improved. Alert and more attentive and organized in thinking. NO more illusions or visual hallucinations,    Rule out cognitive impairment due to major depression.     Alcohol use disorder, severity unspecified, in remission for 9 years.     Pertinent Medical Diagnoses:  SUPA and hypernatremia-resolved.    Recommendations:  1) Continue lower dose Lexapro 15mg po daily for anxiety/depression due to concern for recent serotonin syndrome. She was on 30mg daily prior to her [place] admission on 4/10/24.     2) Continue Lithium but change to 300mg nightly to simplify dosing. Being used for depression. Will be HELD on evenings before ECT.    3) Continue Abilify 7.5mg nightly to help with depression.    4) Continue Lyrica 50mg po three times a day for chronic pain. Will be HELD on the afternoon before ECT.     5) Left a message for her outpatient psychiatrist Dr. Ulises Capps on 24 updating her on the plan above.    6) Dispo-- IF she continues to eat > 50% of her meals, transfer to [facility]  tomorrow.     Freddy Marie MD    History of Present Illness:  70 yo female with recurrent major depressive disorder initially presented to [facility] on 4/10/24 for altered mental status with confusion, restlessness, and agitation.     Per psych liaison:  \" reports that patient has had uncontrollable movements, is taking her clothes off, has been confused and does not know the names of anybody.     says that since Friday she has had these difficulties and it seemed to have gotten worse the last 2 days where now, in addition to those difficulties, she also cannot control her bowels.  She says she has been soiling her clothes and her bed.   said he wonders if it is could be attributed to the recent prescribing of meds with codeine that were meant to manage a cough that she had.   also reports that patient has \"been in bed over a year\".  He says that she does not say much and sleeps a lot.  He says she is grieving the loss of 2 dogs that  5 years ago as well as grieving the loss of their adopted son, who 7 yrs ago, at 33 y/o walked out of their lives. He said that pt has friends and she does occasionally go to lunch with them and does attend AA meetings once a week.  He said that she will eat the food that he cooks for her.  states that he is power of  for his wife.  Counselor advised him to bring copies of that paperwork to the hospital so that staff can scan it into the clinic record.\"    She was eval by Dr. Mckenzie (psych service at [facility]) and thought to have delirium and depression with excitable catatonia vs serotonin syndrome (SS). The Lexapro was decreased from 30mg to 10mg (then increased to 15mg) and Lyrica and Codeine prn stopped just in case she had SS. She was started on benzos, Klonipin and then Ativan, which helped with both the possible excitable catatonia vs SS. Wellbutrin was DC'ed (doesn't increase serotonin, but rather dopamine and nor-epi),  Abilify continued, and Lithium started on 4/18/24. She was no longer so restless and agitated and disorganized, but remained apathetic with minimal verbal communication. She was eating/drinking little.    She was transferred to [facility] on 4/19/24. She developed SUPA and hypernatremia due to low po intake and was sent to the [place] ED. She was started on IVF's and admitted to the [facility] floor.    Currently, she has depressed mood, guilty worthless feelings, hopelessness, decreased motivation and a lack of appetite leading to low po intake. She ate only 20% of her breakfast per RN. She denies any tremors or jerking movements.     Discussed ECT and recommendation to start it. She said she was fine with treatment, but I did not think she understood or appreciated the pros/cons of treatment.  is POA.     Interval Hx:  4/23/2024- She ate 20%/60%/25% of her meals yesterday. She ate about 30% of her breakfast and lunch today. She was found on the floor with her knees earlier today; did not hit her head. She is now in the chair. Per RN, she had difficultly initiating walking, but once she started, she was able to ambulate well with the walker.     Currently, she feels tired and depressed. She cries when she thinks about her estranged son and her dogs that have passed away. She has low energy/motivation/appetite and feelings of hopelessness. No suicidal ideation. She endorsed seeing a mouse in the room this AM; no voices/visions now.    Discussed ECT and she appeared to understand more today. Her  who is POA wants her to start the treatments.    Regarding her chronic back pain, it is 4-5/10 with 10 being severe pain.    4/24/2024- She feels tired and sleepy this AM. She feels depressed, lacks motivation, and has a low appetite. She ate about 30% of her breakfast per RN. She remains forgetful and disoriented to month. She feels hopeless when she thinks about her estranged son. She shares how her sister had severe  depression and was treated with ECT before. She is open to ECT.  is POA and wants her to get it as well.     ADDENDUM: Talked to Dr. Adames. Patient's capacity appears to wax and wane, so  (POA) will be involved with consent for ECT. He wants her to have it. At this time, there are no open spots for tomorrow. Unlikely Friday as well. Will most likely received 1st ECT on Monday 4/2/9/24. UNHELD the Ativan, Lyrica, and Lithium.     4/25/2024- She at about 30% of her meals yesterday. She ate 50% of her breakfast today. She told staff that she saw a dog in the room, but she did have a visit from the therapy dog yesterday. I can see the card photo of the dog. While I was in the room with her, she thought she saw a cat in the corner of the room. When I asked her to point to the location, she said the cat disappeared.     She feels tired, anxious, and depressed. She shares how her son Alexandru was adopted at 3 months from [place]. He came out as walker and was not ostracized from the family but accepted. He was brought up in a Yazidism household and in Yazidism schools. She wonders whether the Yazidism belief about homosexuality being a sin is the reason he doesn't associate with them anymore. She isn't sure. She feels sad and guilty when she thinks about him.     Asked her about what she recalled about ECT and she said it was a treatment for her depression. She wasn't able to tell me the pros and cons of the treatment. Discussed how her  will have to consent for treatment and she was fine with that.    4/26/2024- She feels tired, anxious, and depressed and has low motivation and appetite. She is eating 30-50% of her meals per report. She is pleasant and cooperative per staff. She is open to ECT on Monday and knows it is a treatment for depression, but is unable to tell me cons or alteratives to treatment. Her  will consent for ECT and wants her to get it.     4/29/2024- She tolerated the ECT  this AM. Denies forgetfulness or headache post-ECT. She feels tired with mild anxiety and depressed mood. She has increased appetite and ate 50% of her breakfast and 65% of her lunch today. She has decreased motivation and anhedonia, but NO hopelessness or suicidal ideation.     Past Psychiatric History: Major depressive disorder treated with Lexapro, Wellbutrin, and Abilify.    Substance Use History: Alcohol use disorder, severity unspecified, in remission x 9 yrs. THC edibles at night.    Psych Family History: None, but tells me her adopted son has mental health issues.    Social and Developmental History: Retired teacher. She lives with her  and adult son who is 41 yr old and going through a divorce. Her adopted son (from [place] per , she told me [place] initially) stopped talking to the family 7 yrs ago. He is 39 yr old.    Past Medical History:    Anxiety state    Back problem    COMPRESSION FX    Cataract    Depression    Esophageal reflux    GERD (gastroesophageal reflux disease)    Hematoma and contusion of liver    STATES HAS BEEN THERE FOR MANY YEARS    History of fractured kneecap    RIGHT    IBS (irritable bowel syndrome)    Mitral prolapse    Osteoarthritis     Past Surgical History:   Procedure Laterality Date          Correct bunion,othr methods      Correct bunion,simple      BILAT.     Dilation/curettage,diagnostic      FOR \"MOLE PREGNANCY\"    Other Right     ORIF RIGHT ANKLE    Spine surgery procedure unlisted      cervical spine 2020    Total knee replacement       Family History   Problem Relation Age of Onset    Heart Disorder Father     Cancer Mother         ESOPHAGUS      reports that she has quit smoking. Her smoking use included cigarettes. She has a 30 pack-year smoking history. She has never used smokeless tobacco. She reports that she does not drink alcohol and does not use drugs.    Allergies:  Allergies   Allergen Reactions    Radiology Contrast  Iodinated Dyes HIVES     HIVES AND THROAT TIGHTENED WHILE HAVING AN MRI    Sulfa Antibiotics HIVES     \"got sicker\"    Methylprednisolone OTHER (SEE COMMENTS)    Seroquel [Quetiapine] UNKNOWN    Cinnamon RASH    Iodine (Topical) RASH       Medications:    Current Facility-Administered Medications:     lactated ringers infusion, , Intravenous, Continuous    glycopyrrolate (Robinul) 0.2 MG/ML injection 0.1 mg, 0.1 mg, Intravenous, Once    ketorolac (Toradol) 15 MG/ML injection 15 mg, 15 mg, Intravenous, Once    ondansetron (Zofran) 4 MG/2ML injection 4 mg, 4 mg, Intravenous, Once    ARIPiprazole (Abilify) tab 7.5 mg, 7.5 mg, Oral, Nightly    enoxaparin (Lovenox) 40 MG/0.4ML SUBQ injection 40 mg, 40 mg, Subcutaneous, Nightly    cholecalciferol (Vitamin D3) tab 1,000 Units, 1,000 Units, Oral, Daily    escitalopram (Lexapro) tab 15 mg, 15 mg, Oral, Nightly    propranolol (Inderal) tab 10 mg, 10 mg, Oral, BID    acetaminophen (Tylenol Extra Strength) tab 1,000 mg, 1,000 mg, Oral, Q8H PRN    melatonin tab 3 mg, 3 mg, Oral, Nightly PRN    ondansetron (Zofran) 4 MG/2ML injection 4 mg, 4 mg, Intravenous, Q6H PRN    polyethylene glycol (PEG 3350) (Miralax) 17 g oral packet 17 g, 17 g, Oral, Daily PRN    sennosides (Senokot) tab 17.2 mg, 17.2 mg, Oral, Nightly PRN    bisacodyl (Dulcolax) 10 MG rectal suppository 10 mg, 10 mg, Rectal, Daily PRN    benzonatate (Tessalon) cap 200 mg, 200 mg, Oral, TID PRN    guaiFENesin (Robitussin) 100 MG/5 ML oral liquid 200 mg, 200 mg, Oral, Q4H PRN    glycerin-hypromellose- (Artificial Tears) 0.2-0.2-1 % ophthalmic solution 1 drop, 1 drop, Both Eyes, QID PRN    sodium chloride (Saline Mist) 0.65 % nasal solution 1 spray, 1 spray, Each Nare, Q3H PRN    albuterol (Ventolin HFA) 108 (90 Base) MCG/ACT inhaler 2 puff, 2 puff, Inhalation, Q4H PRN    magnesium hydroxide (Milk of Magnesia) 400 MG/5ML oral suspension 30 mL, 30 mL, Oral, Nightly PRN    alum-mag hydroxide-simethicone (Maalox)  200-200-20 MG/5ML oral suspension 30 mL, 30 mL, Oral, Q4H PRN    acetaminophen (Tylenol) tab 325 mg, 325 mg, Oral, Q4H PRN **OR** acetaminophen (Tylenol) tab 650 mg, 650 mg, Oral, Q4H PRN    traZODone (Desyrel) tab 50 mg, 50 mg, Oral, Nightly PRN    Facility-Administered Medications Ordered in Other Encounters:     lactated ringers infusion, , Intravenous, Continuous    glucose (Dex4) 15 GM/59ML oral liquid 15 g, 15 g, Oral, Q15 Min PRN **OR** glucose (Glutose) 40% oral gel 15 g, 15 g, Oral, Q15 Min PRN **OR** glucose-vitamin C (Dex-4) chewable tab 4 tablet, 4 tablet, Oral, Q15 Min PRN **OR** dextrose 50% injection 50 mL, 50 mL, Intravenous, Q15 Min PRN **OR** glucose (Dex4) 15 GM/59ML oral liquid 30 g, 30 g, Oral, Q15 Min PRN **OR** glucose (Glutose) 40% oral gel 30 g, 30 g, Oral, Q15 Min PRN **OR** glucose-vitamin C (Dex-4) chewable tab 8 tablet, 8 tablet, Oral, Q15 Min PRN    lactated ringers infusion, , Intravenous, Continuous    lactated ringers IV bolus 500 mL, 500 mL, Intravenous, Once PRN    labetalol (Trandate) 5 mg/mL injection 5 mg, 5 mg, Intravenous, Q5 Min PRN    naloxone (Narcan) 0.4 MG/ML injection 80 mcg, 80 mcg, Intravenous, PRN    midazolam (Versed) 2 MG/2ML injection 1 mg, 1 mg, Intravenous, Q5 Min PRN    fentaNYL (Sublimaze) 50 mcg/mL injection 25 mcg, 25 mcg, Intravenous, Q5 Min PRN **OR** fentaNYL (Sublimaze) 50 mcg/mL injection 50 mcg, 50 mcg, Intravenous, Q5 Min PRN    HYDROmorphone (Dilaudid) 1 MG/ML injection 0.2 mg, 0.2 mg, Intravenous, Q5 Min PRN **OR** HYDROmorphone (Dilaudid) 1 MG/ML injection 0.4 mg, 0.4 mg, Intravenous, Q5 Min PRN **OR** HYDROmorphone (Dilaudid) 1 MG/ML injection 0.6 mg, 0.6 mg, Intravenous, Q5 Min PRN    acetaminophen (Tylenol Extra Strength) tab 1,000 mg, 1,000 mg, Oral, Once PRN    Review of Systems   Constitutional:  Positive for malaise/fatigue.   Psychiatric/Behavioral:  Positive for depression. Negative for suicidal ideas. The patient is nervous/anxious.       Mental Status Exam:     Objective      Vitals:    04/29/24 1147   BP:    Pulse: 90   Resp: 26   Temp:      Appearance: fair grooming  Behavior: cooperative, fair eye contact  Gait: not observed    Speech: soft, fluent     Mood: depressed and anxious  Affect: Congruent    Thought process: logical   Thought content: no hallucinations    Orientation: oriented person, place, and year  Attention and Concentration: improving, fair now  Memory:  intact remote and impaired recent  Language: Intact naming   Fund of Knowledge: Able to recite name of [place] president    Insight: fair now  Judgment: fair now      Stable

## 2024-05-09 ENCOUNTER — APPOINTMENT (OUTPATIENT)
Dept: UROLOGY | Facility: CLINIC | Age: 69
End: 2024-05-09
Payer: COMMERCIAL

## 2024-05-09 VITALS
OXYGEN SATURATION: 97 % | HEART RATE: 88 BPM | WEIGHT: 163 LBS | HEIGHT: 69 IN | DIASTOLIC BLOOD PRESSURE: 77 MMHG | SYSTOLIC BLOOD PRESSURE: 146 MMHG | BODY MASS INDEX: 24.14 KG/M2

## 2024-05-09 DIAGNOSIS — C61 MALIGNANT NEOPLASM OF PROSTATE: ICD-10-CM

## 2024-05-09 PROCEDURE — 99213 OFFICE O/P EST LOW 20 MIN: CPT

## 2024-05-09 NOTE — ASSESSMENT
[FreeTextEntry1] : 67 yo M with low risk prostate cancer, GG1 on active surveillance. Negative Prostate MRI 7/2023. Will repeat PSA in 1 month.   Patient will RTO in 6 months. Plan to obtain surveillance MRI after that.

## 2024-05-09 NOTE — HISTORY OF PRESENT ILLNESS
[FreeTextEntry1] : 67 year old man seen 06/15/2023 with complaint of elevated PSA. Pt has seen multiple urologists. His most recent urologist reportedly recommended MRI for PSA rise from 5 to 5.3. Pt had AICD/pacemaker recently removed and is scheduled for leads to be removed so he can have MRIs. Of note, he had prostate biopsy by Dr Doyle a few years ago. Pt is not sure of results, but says there was "a little bit" of cancer, treatment was not recommended at that time. He denies bothersome LUTS.  No hematuria, no dysuria, no frequency, no urgency, no hesitancy, no straining. No incontinence.  No fevers, no chills, no nausea, no vomiting, no flank pain.   05/09/2024:  68-year-old male presents for follow up. Last PSA 12/2023 6.5. PSA 8/2023 about the same 6.9. Prostate MRI in 7/2023 PIRADS 2, 67 cc prostate . He denies bothersome LUTS. PVR 31ml.  No hematuria, no dysuria, no frequency, no urgency, no hesitancy, no straining. No incontinence.  No fevers, no chills, no nausea, no vomiting, no flank pain.

## 2024-05-09 NOTE — PHYSICAL EXAM
[General Appearance - Well Developed] : well developed [General Appearance - Well Nourished] : well nourished [Urethral Meatus] : meatus normal [Scrotum] : the scrotum was normal [Testes Mass (___cm)] : there were no testicular masses [No Prostate Nodules] : no prostate nodules [Prostate Size ___ gm] : prostate size [unfilled] gm [Not Anxious] : not anxious [Normal Appearance] : normal appearance [Well Groomed] : well groomed [General Appearance - In No Acute Distress] : no acute distress [Edema] : no peripheral edema [Respiration, Rhythm And Depth] : normal respiratory rhythm and effort [Exaggerated Use Of Accessory Muscles For Inspiration] : no accessory muscle use [Abdomen Soft] : soft [Abdomen Tenderness] : non-tender [Costovertebral Angle Tenderness] : no ~M costovertebral angle tenderness [Urinary Bladder Findings] : the bladder was normal on palpation [Normal Station and Gait] : the gait and station were normal for the patient's age [] : no rash [No Focal Deficits] : no focal deficits [Oriented To Time, Place, And Person] : oriented to person, place, and time [Affect] : the affect was normal [Mood] : the mood was normal [No Palpable Adenopathy] : no palpable adenopathy

## 2024-05-10 LAB
APPEARANCE: CLEAR
BACTERIA: NEGATIVE /HPF
BILIRUBIN URINE: NEGATIVE
BLOOD URINE: NEGATIVE
CAST: 0 /LPF
COLOR: YELLOW
EPITHELIAL CELLS: 0 /HPF
GLUCOSE QUALITATIVE U: NEGATIVE MG/DL
KETONES URINE: NEGATIVE MG/DL
LEUKOCYTE ESTERASE URINE: ABNORMAL
MICROSCOPIC-UA: NORMAL
NITRITE URINE: NEGATIVE
PH URINE: 6.5
PROTEIN URINE: NEGATIVE MG/DL
RED BLOOD CELLS URINE: 0 /HPF
SPECIFIC GRAVITY URINE: 1.01
UROBILINOGEN URINE: 0.2 MG/DL
WHITE BLOOD CELLS URINE: 0 /HPF

## 2024-05-13 LAB — BACTERIA UR CULT: NORMAL

## 2024-10-18 ENCOUNTER — OUTPATIENT (OUTPATIENT)
Dept: OUTPATIENT SERVICES | Facility: HOSPITAL | Age: 69
LOS: 1 days | End: 2024-10-18
Payer: MEDICARE

## 2024-10-18 ENCOUNTER — APPOINTMENT (OUTPATIENT)
Dept: MRI IMAGING | Facility: CLINIC | Age: 69
End: 2024-10-18
Payer: MEDICARE

## 2024-10-18 DIAGNOSIS — Z94.4 LIVER TRANSPLANT STATUS: Chronic | ICD-10-CM

## 2024-10-18 DIAGNOSIS — M25.531 PAIN IN RIGHT WRIST: ICD-10-CM

## 2024-10-18 DIAGNOSIS — Z98.890 OTHER SPECIFIED POSTPROCEDURAL STATES: Chronic | ICD-10-CM

## 2024-10-18 DIAGNOSIS — Z98.49 CATARACT EXTRACTION STATUS, UNSPECIFIED EYE: Chronic | ICD-10-CM

## 2024-10-18 DIAGNOSIS — Z95.0 PRESENCE OF CARDIAC PACEMAKER: Chronic | ICD-10-CM

## 2024-10-18 PROCEDURE — 73221 MRI JOINT UPR EXTREM W/O DYE: CPT | Mod: 26,RT,MH

## 2024-11-20 PROCEDURE — 73221 MRI JOINT UPR EXTREM W/O DYE: CPT

## 2024-12-16 ENCOUNTER — EMERGENCY (EMERGENCY)
Facility: HOSPITAL | Age: 69
LOS: 0 days | Discharge: HOME | End: 2024-12-17
Attending: EMERGENCY MEDICINE
Payer: MEDICARE

## 2024-12-16 DIAGNOSIS — Z98.49 CATARACT EXTRACTION STATUS, UNSPECIFIED EYE: Chronic | ICD-10-CM

## 2024-12-16 DIAGNOSIS — Z94.4 LIVER TRANSPLANT STATUS: ICD-10-CM

## 2024-12-16 DIAGNOSIS — R10.31 RIGHT LOWER QUADRANT PAIN: ICD-10-CM

## 2024-12-16 DIAGNOSIS — Z95.0 PRESENCE OF CARDIAC PACEMAKER: Chronic | ICD-10-CM

## 2024-12-16 DIAGNOSIS — Z98.890 OTHER SPECIFIED POSTPROCEDURAL STATES: Chronic | ICD-10-CM
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
## 2024-12-16 DIAGNOSIS — I50.9 HEART FAILURE, UNSPECIFIED: ICD-10-CM

## 2024-12-16 DIAGNOSIS — E78.5 HYPERLIPIDEMIA, UNSPECIFIED: ICD-10-CM

## 2024-12-16 DIAGNOSIS — K74.60 UNSPECIFIED CIRRHOSIS OF LIVER: ICD-10-CM

## 2024-12-16 DIAGNOSIS — Z94.4 LIVER TRANSPLANT STATUS: Chronic | ICD-10-CM

## 2024-12-16 PROCEDURE — 82550 ASSAY OF CK (CPK): CPT

## 2024-12-16 PROCEDURE — 93971 EXTREMITY STUDY: CPT | Mod: RT

## 2024-12-16 PROCEDURE — 99284 EMERGENCY DEPT VISIT MOD MDM: CPT

## 2024-12-16 PROCEDURE — 80048 BASIC METABOLIC PNL TOTAL CA: CPT

## 2024-12-16 PROCEDURE — 36000 PLACE NEEDLE IN VEIN: CPT

## 2024-12-16 PROCEDURE — 36415 COLL VENOUS BLD VENIPUNCTURE: CPT

## 2024-12-16 PROCEDURE — 81003 URINALYSIS AUTO W/O SCOPE: CPT

## 2024-12-16 PROCEDURE — 99284 EMERGENCY DEPT VISIT MOD MDM: CPT | Mod: 25

## 2024-12-16 PROCEDURE — 85025 COMPLETE CBC W/AUTO DIFF WBC: CPT

## 2024-12-17 VITALS
SYSTOLIC BLOOD PRESSURE: 147 MMHG | OXYGEN SATURATION: 99 % | RESPIRATION RATE: 18 BRPM | HEART RATE: 82 BPM | DIASTOLIC BLOOD PRESSURE: 89 MMHG | WEIGHT: 160.06 LBS | TEMPERATURE: 98 F

## 2024-12-17 VITALS
RESPIRATION RATE: 16 BRPM | SYSTOLIC BLOOD PRESSURE: 141 MMHG | TEMPERATURE: 98 F | DIASTOLIC BLOOD PRESSURE: 91 MMHG | HEART RATE: 86 BPM | OXYGEN SATURATION: 97 %

## 2024-12-17 LAB
ANION GAP SERPL CALC-SCNC: 6 MMOL/L — SIGNIFICANT CHANGE UP (ref 5–17)
APPEARANCE UR: CLEAR — SIGNIFICANT CHANGE UP
BASOPHILS # BLD AUTO: 0.03 K/UL — SIGNIFICANT CHANGE UP (ref 0–0.2)
BASOPHILS NFR BLD AUTO: 0.3 % — SIGNIFICANT CHANGE UP (ref 0–2)
BILIRUB UR-MCNC: NEGATIVE — SIGNIFICANT CHANGE UP
BUN SERPL-MCNC: 16 MG/DL — SIGNIFICANT CHANGE UP (ref 7–23)
CALCIUM SERPL-MCNC: 9 MG/DL — SIGNIFICANT CHANGE UP (ref 8.5–10.1)
CHLORIDE SERPL-SCNC: 109 MMOL/L — HIGH (ref 96–108)
CK SERPL-CCNC: 95 U/L — SIGNIFICANT CHANGE UP (ref 26–308)
CO2 SERPL-SCNC: 25 MMOL/L — SIGNIFICANT CHANGE UP (ref 22–31)
COLOR SPEC: YELLOW — SIGNIFICANT CHANGE UP
CREAT SERPL-MCNC: 1.06 MG/DL — SIGNIFICANT CHANGE UP (ref 0.5–1.3)
DIFF PNL FLD: NEGATIVE — SIGNIFICANT CHANGE UP
EGFR: 75 ML/MIN/1.73M2 — SIGNIFICANT CHANGE UP
EOSINOPHIL # BLD AUTO: 0.27 K/UL — SIGNIFICANT CHANGE UP (ref 0–0.5)
EOSINOPHIL NFR BLD AUTO: 3.1 % — SIGNIFICANT CHANGE UP (ref 0–6)
GLUCOSE SERPL-MCNC: 107 MG/DL — HIGH (ref 70–99)
GLUCOSE UR QL: NEGATIVE MG/DL — SIGNIFICANT CHANGE UP
HCT VFR BLD CALC: 45.4 % — SIGNIFICANT CHANGE UP (ref 39–50)
HGB BLD-MCNC: 15.5 G/DL — SIGNIFICANT CHANGE UP (ref 13–17)
IMM GRANULOCYTES NFR BLD AUTO: 0.5 % — SIGNIFICANT CHANGE UP (ref 0–0.9)
KETONES UR-MCNC: NEGATIVE MG/DL — SIGNIFICANT CHANGE UP
LEUKOCYTE ESTERASE UR-ACNC: NEGATIVE — SIGNIFICANT CHANGE UP
LYMPHOCYTES # BLD AUTO: 2.28 K/UL — SIGNIFICANT CHANGE UP (ref 1–3.3)
LYMPHOCYTES # BLD AUTO: 26.5 % — SIGNIFICANT CHANGE UP (ref 13–44)
MCHC RBC-ENTMCNC: 30.2 PG — SIGNIFICANT CHANGE UP (ref 27–34)
MCHC RBC-ENTMCNC: 34.1 G/DL — SIGNIFICANT CHANGE UP (ref 32–36)
MCV RBC AUTO: 88.3 FL — SIGNIFICANT CHANGE UP (ref 80–100)
MONOCYTES # BLD AUTO: 0.77 K/UL — SIGNIFICANT CHANGE UP (ref 0–0.9)
MONOCYTES NFR BLD AUTO: 9 % — SIGNIFICANT CHANGE UP (ref 2–14)
NEUTROPHILS # BLD AUTO: 5.21 K/UL — SIGNIFICANT CHANGE UP (ref 1.8–7.4)
NEUTROPHILS NFR BLD AUTO: 60.6 % — SIGNIFICANT CHANGE UP (ref 43–77)
NITRITE UR-MCNC: NEGATIVE — SIGNIFICANT CHANGE UP
PH UR: 7 — SIGNIFICANT CHANGE UP (ref 5–8)
PLATELET # BLD AUTO: 210 K/UL — SIGNIFICANT CHANGE UP (ref 150–400)
POTASSIUM SERPL-MCNC: 3.7 MMOL/L — SIGNIFICANT CHANGE UP (ref 3.5–5.3)
POTASSIUM SERPL-SCNC: 3.7 MMOL/L — SIGNIFICANT CHANGE UP (ref 3.5–5.3)
PROT UR-MCNC: NEGATIVE MG/DL — SIGNIFICANT CHANGE UP
RBC # BLD: 5.14 M/UL — SIGNIFICANT CHANGE UP (ref 4.2–5.8)
RBC # FLD: 13.5 % — SIGNIFICANT CHANGE UP (ref 10.3–14.5)
SODIUM SERPL-SCNC: 140 MMOL/L — SIGNIFICANT CHANGE UP (ref 135–145)
SP GR SPEC: 1.01 — SIGNIFICANT CHANGE UP (ref 1–1.03)
UROBILINOGEN FLD QL: 0.2 MG/DL — SIGNIFICANT CHANGE UP (ref 0.2–1)
WBC # BLD: 8.6 K/UL — SIGNIFICANT CHANGE UP (ref 3.8–10.5)
WBC # FLD AUTO: 8.6 K/UL — SIGNIFICANT CHANGE UP (ref 3.8–10.5)

## 2024-12-17 PROCEDURE — 93971 EXTREMITY STUDY: CPT | Mod: 26,RT

## 2024-12-17 RX ORDER — CYCLOBENZAPRINE HCL 10 MG
1 TABLET ORAL
Qty: 12 | Refills: 0
Start: 2024-12-17 | End: 2024-12-20

## 2024-12-17 RX ORDER — CYCLOBENZAPRINE HCL 10 MG
10 TABLET ORAL ONCE
Refills: 0 | Status: COMPLETED | OUTPATIENT
Start: 2024-12-17 | End: 2024-12-17

## 2024-12-17 RX ADMIN — Medication 10 MILLIGRAM(S): at 01:24

## 2024-12-17 NOTE — ED PROVIDER NOTE - NSICDXPASTSURGICALHX_GEN_ALL_CORE_FT
PAST SURGICAL HISTORY:  H/O bilateral inguinal hernia repair     H/O cataract removal with insertion of prosthetic lens right eye    H/O liver transplant     H/O umbilical hernia repair     Pacemaker Removal of pacemaker /AICD [id number] still in place

## 2024-12-17 NOTE — ED ADULT NURSE NOTE - NSFALLHARMRISKINTERV_ED_ALL_ED
Assistance OOB with selected safe patient handling equipment if applicable/Communicate risk of Fall with Harm to all staff, patient, and family/Provide patient with walking aids/Provide visual cue: red socks, yellow wristband, yellow gown, etc/Reinforce activity limits and safety measures with patient and family/Bed in lowest position, wheels locked, appropriate side rails in place/Call bell, personal items and telephone in reach/Instruct patient to call for assistance before getting out of bed/chair/stretcher/Non-slip footwear applied when patient is off stretcher/[place] to call system/Physically safe environment - no spills, clutter or unnecessary equipment/Purposeful Proactive Rounding/Room/bathroom lighting operational, light cord in reach

## 2024-12-17 NOTE — ED PROVIDER NOTE - PHYSICAL EXAMINATION
CONSTITUTIONAL: Well appearing, awake, alert, oriented to person, place, time/situation and in no apparent distress.  · ENMT: Airway patent, Nasal mucosa clear. Mouth with normal mucosa. Throat has no vesicles, no oropharyngeal exudates and uvula is midline.  · EYES: Clear bilaterally, pupils equal, round and reactive to light.  · CARDIAC: Normal rate, regular rhythm.  Heart sounds S1, S2.  No murmurs, rubs or gallops.  · RESPIRATORY: Breath sounds clear and equal bilaterally.  · GASTROINTESTINAL: Abdomen soft, non-tender, no guarding.  · MUSCULOSKELETAL: Spine appears normal,   Tenderness to palpation in right inguinal region.  Pain with straight leg raise but patient has full range of motion and 5 out of 5 strength.  Pain is not reproduced by passive flexion of the hip, external rotation or internal rotation  · NEUROLOGICAL: Alert and oriented, no focal deficits, no motor or sensory deficits.  · SKIN: Skin normal color for race, warm, dry and intact. No evidence of rash

## 2024-12-17 NOTE — ED ADULT NURSE NOTE - NS ED PATIENT SAFETY CONCERN
Received med refill from [facility] pharmacy on gabapentin  Last RF 9-11-18  Last Harjit 9-11-18  Last OV 9-19-18  Next OV 3-26-19     No

## 2024-12-17 NOTE — ED PROVIDER NOTE - OBJECTIVE STATEMENT
69-year-old male with history hyperlipidemia, hepatitis C, cirrhosis status post liver transplant, CHF presents for evaluation of right groin pain that is progressively worsening over the past 3 days after he installed carpet in the family member's house. he denies testicular pain  Patient states that he noticed some pain in his gluteal area as well initially after installing the carpet but that has resolved.  Patient notes that there is a tender spot in his right inguinal area and it got worse throughout the day making it difficult for him to bear weight and ambulate.  Patient denies any chest pain or shortness of breath.  Patient is not on any anticoagulation.  Patient denies any fever/chills, loss of appetite or other complications.  Patient has taken ibuprofen approximately 2 hours ago with some relief.

## 2024-12-17 NOTE — ED ADULT NURSE NOTE - NSICDXPASTSURGICALHX_GEN_ALL_CORE_FT
PAST SURGICAL HISTORY:  H/O bilateral inguinal hernia repair     H/O cataract removal with insertion of prosthetic lens right eye    H/O liver transplant     H/O umbilical hernia repair     Pacemaker Removal of pacemaker /AICD [id number] still in place

## 2024-12-17 NOTE — ED PROVIDER NOTE - CLINICAL SUMMARY MEDICAL DECISION MAKING FREE TEXT BOX
patient history and physical, patient's pain is most likely due to groin strain but will get right lower extremity venous Doppler to rule out DVT, basic labs and reassess.  Will give patient muscle relaxers for further pain relief.

## 2024-12-17 NOTE — ED ADULT TRIAGE NOTE - CHIEF COMPLAINT QUOTE
patient c/o worsening right groin pain today.  patient states yesterday he installed carpet.  pain is non radiating.

## 2024-12-17 NOTE — ED ADULT NURSE NOTE - OBJECTIVE STATEMENT
69y male AAOx3 BIBEMS from home complaining of right sided groin pain for 3x days. PMH cirrhosis s/p liver transplant- reports full compliance with medication. As per patient, he was installing carpet a few days ago and noticed some tenderness to the right gluteal muscle afterwards, as well as the right groin. Reports the pain to the right groin/ inguinal region has gotten worse over the days. Denies radiation of pain, however states it us unable to bear weight and is interfering with ambulation. Denies testicular pain or painful urination. No falls. No chest pain, SOB, N/V/D. Respirations are even and unlabored, in no acute distress.

## 2024-12-17 NOTE — ED PROVIDER NOTE - PATIENT PORTAL LINK FT
You can access the FollowMyHealth Patient Portal offered by [facility] by registering at the following website: [email or web address]. By joining [facility]’s FollowMyHealth portal, you will also be able to view your health information using other applications (apps) compatible with our system.

## 2024-12-17 NOTE — ED ADULT NURSE NOTE - NSFALLDEVICES_ED_ALL_ED
Pt tested positive for covid on Monday. Today patient felt extremely dizzy and sweaty. He felt like he was going to pass out. His saturation for O2 is 90-92%, and his heart rate is between .
Spoke to wife of patient. Patient has fever of 101.3  It has been 2 hours since ibuprofen so gave him tylenol. Pulse ox has been consistently 90-92%  Patient was dizzy and sweaty. Advised wife to bring patient to ER now.    Wife verbalized understandi
Other

## 2025-01-30 ENCOUNTER — APPOINTMENT (OUTPATIENT)
Dept: UROLOGY | Facility: CLINIC | Age: 70
End: 2025-01-30
Payer: MEDICARE

## 2025-01-30 VITALS — HEART RATE: 87 BPM | BODY MASS INDEX: 24.14 KG/M2 | HEIGHT: 69 IN | WEIGHT: 163 LBS | OXYGEN SATURATION: 98 %

## 2025-01-30 DIAGNOSIS — N40.1 BENIGN PROSTATIC HYPERPLASIA WITH LOWER URINARY TRACT SYMPMS: ICD-10-CM

## 2025-01-30 DIAGNOSIS — N13.8 BENIGN PROSTATIC HYPERPLASIA WITH LOWER URINARY TRACT SYMPMS: ICD-10-CM

## 2025-01-30 DIAGNOSIS — R39.15 URGENCY OF URINATION: ICD-10-CM

## 2025-01-30 DIAGNOSIS — C61 MALIGNANT NEOPLASM OF PROSTATE: ICD-10-CM

## 2025-01-30 PROCEDURE — 99214 OFFICE O/P EST MOD 30 MIN: CPT

## 2025-01-31 LAB
APPEARANCE: ABNORMAL
BACTERIA: NEGATIVE /HPF
BILIRUBIN URINE: NEGATIVE
BLOOD URINE: NEGATIVE
CAST: 0 /LPF
COLOR: YELLOW
EPITHELIAL CELLS: 0 /HPF
GLUCOSE QUALITATIVE U: NEGATIVE MG/DL
KETONES URINE: NEGATIVE MG/DL
LEUKOCYTE ESTERASE URINE: NEGATIVE
MICROSCOPIC-UA: NORMAL
NITRITE URINE: NEGATIVE
PH URINE: 6.5
PROTEIN URINE: NEGATIVE MG/DL
PSA SERPL-MCNC: 8.73 NG/ML
RED BLOOD CELLS URINE: 1 /HPF
SPECIFIC GRAVITY URINE: 1.02
UROBILINOGEN URINE: 0.2 MG/DL
WHITE BLOOD CELLS URINE: 0 /HPF

## 2025-02-03 LAB — BACTERIA UR CULT: NORMAL

## 2025-04-01 ENCOUNTER — APPOINTMENT (OUTPATIENT)
Dept: ORTHOPEDIC SURGERY | Facility: CLINIC | Age: 70
End: 2025-04-01
Payer: MEDICARE

## 2025-04-01 DIAGNOSIS — S52.611S: ICD-10-CM

## 2025-04-01 DIAGNOSIS — M25.539 PAIN IN UNSPECIFIED WRIST: ICD-10-CM

## 2025-04-01 PROCEDURE — 99203 OFFICE O/P NEW LOW 30 MIN: CPT

## 2025-04-01 PROCEDURE — 73110 X-RAY EXAM OF WRIST: CPT | Mod: RT

## 2025-04-07 ENCOUNTER — OUTPATIENT (OUTPATIENT)
Dept: OUTPATIENT SERVICES | Facility: HOSPITAL | Age: 70
LOS: 1 days | End: 2025-04-07
Payer: MEDICARE

## 2025-04-07 ENCOUNTER — RESULT REVIEW (OUTPATIENT)
Age: 70
End: 2025-04-07

## 2025-04-07 ENCOUNTER — APPOINTMENT (OUTPATIENT)
Dept: MRI IMAGING | Facility: CLINIC | Age: 70
End: 2025-04-07
Payer: MEDICARE

## 2025-04-07 DIAGNOSIS — Z98.890 OTHER SPECIFIED POSTPROCEDURAL STATES: Chronic | ICD-10-CM

## 2025-04-07 DIAGNOSIS — Z95.0 PRESENCE OF CARDIAC PACEMAKER: Chronic | ICD-10-CM

## 2025-04-07 DIAGNOSIS — Z94.4 LIVER TRANSPLANT STATUS: Chronic | ICD-10-CM

## 2025-04-07 DIAGNOSIS — C61 MALIGNANT NEOPLASM OF PROSTATE: ICD-10-CM

## 2025-04-07 DIAGNOSIS — Z98.49 CATARACT EXTRACTION STATUS, UNSPECIFIED EYE: Chronic | ICD-10-CM

## 2025-04-07 PROCEDURE — 72197 MRI PELVIS W/O & W/DYE: CPT

## 2025-04-07 PROCEDURE — 71045 X-RAY EXAM CHEST 1 VIEW: CPT

## 2025-04-07 PROCEDURE — 76498P: CUSTOM | Mod: 26

## 2025-04-07 PROCEDURE — A9585: CPT

## 2025-04-07 PROCEDURE — 72197 MRI PELVIS W/O & W/DYE: CPT | Mod: 26

## 2025-04-07 PROCEDURE — 76498 UNLISTED MR PROCEDURE: CPT

## 2025-04-07 PROCEDURE — 71045 X-RAY EXAM CHEST 1 VIEW: CPT | Mod: 26

## 2025-04-09 DIAGNOSIS — R97.20 ELEVATED PROSTATE, SPECIFIC ANTIGEN [PSA]: ICD-10-CM

## 2025-04-09 DIAGNOSIS — C61 MALIGNANT NEOPLASM OF PROSTATE: ICD-10-CM

## 2025-04-09 RX ORDER — BISACODYL 10 MG/1
19-7 SUPPOSITORY RECTAL
Qty: 1 | Refills: 0 | Status: ACTIVE | COMMUNITY
Start: 2025-04-09 | End: 1900-01-01

## 2025-04-10 ENCOUNTER — NON-APPOINTMENT (OUTPATIENT)
Age: 70
End: 2025-04-10

## 2025-04-12 ENCOUNTER — OUTPATIENT (OUTPATIENT)
Dept: OUTPATIENT SERVICES | Facility: HOSPITAL | Age: 70
LOS: 1 days | End: 2025-04-12
Payer: MEDICARE

## 2025-04-12 DIAGNOSIS — Z98.890 OTHER SPECIFIED POSTPROCEDURAL STATES: Chronic | ICD-10-CM

## 2025-04-12 DIAGNOSIS — Z94.4 LIVER TRANSPLANT STATUS: Chronic | ICD-10-CM

## 2025-04-12 DIAGNOSIS — Z00.8 ENCOUNTER FOR OTHER GENERAL EXAMINATION: ICD-10-CM

## 2025-04-12 DIAGNOSIS — Z98.49 CATARACT EXTRACTION STATUS, UNSPECIFIED EYE: Chronic | ICD-10-CM

## 2025-04-12 DIAGNOSIS — Z95.0 PRESENCE OF CARDIAC PACEMAKER: Chronic | ICD-10-CM

## 2025-04-14 PROCEDURE — C8001: CPT

## 2025-04-15 ENCOUNTER — NON-APPOINTMENT (OUTPATIENT)
Age: 70
End: 2025-04-15

## 2025-04-16 ENCOUNTER — APPOINTMENT (OUTPATIENT)
Dept: UROLOGY | Facility: CLINIC | Age: 70
End: 2025-04-16
Payer: COMMERCIAL

## 2025-04-16 VITALS
OXYGEN SATURATION: 96 % | SYSTOLIC BLOOD PRESSURE: 122 MMHG | RESPIRATION RATE: 16 BRPM | HEART RATE: 85 BPM | BODY MASS INDEX: 24.14 KG/M2 | HEIGHT: 69 IN | DIASTOLIC BLOOD PRESSURE: 89 MMHG | WEIGHT: 163 LBS

## 2025-04-16 VITALS — SYSTOLIC BLOOD PRESSURE: 132 MMHG | HEART RATE: 99 BPM | DIASTOLIC BLOOD PRESSURE: 90 MMHG

## 2025-04-16 PROCEDURE — 76999F: CUSTOM

## 2025-04-16 PROCEDURE — 55700: CPT

## 2025-04-22 LAB — CORE LAB BIOPSY: NORMAL

## 2025-04-23 ENCOUNTER — APPOINTMENT (OUTPATIENT)
Dept: ORTHOPEDIC SURGERY | Facility: CLINIC | Age: 70
End: 2025-04-23

## 2025-04-23 DIAGNOSIS — M25.539 PAIN IN UNSPECIFIED WRIST: ICD-10-CM

## 2025-04-23 DIAGNOSIS — M77.8 OTHER ENTHESOPATHIES, NOT ELSEWHERE CLASSIFIED: ICD-10-CM

## 2025-04-23 DIAGNOSIS — S52.611S: ICD-10-CM

## 2025-04-23 PROCEDURE — 99213 OFFICE O/P EST LOW 20 MIN: CPT

## 2025-05-03 ENCOUNTER — NON-APPOINTMENT (OUTPATIENT)
Age: 70
End: 2025-05-03

## 2025-05-05 ENCOUNTER — APPOINTMENT (OUTPATIENT)
Dept: UROLOGY | Facility: CLINIC | Age: 70
End: 2025-05-05
Payer: MEDICARE

## 2025-05-05 DIAGNOSIS — C61 MALIGNANT NEOPLASM OF PROSTATE: ICD-10-CM

## 2025-05-05 PROCEDURE — 99214 OFFICE O/P EST MOD 30 MIN: CPT

## 2025-05-23 ENCOUNTER — APPOINTMENT (OUTPATIENT)
Dept: ORTHOPEDIC SURGERY | Facility: CLINIC | Age: 70
End: 2025-05-23
Payer: MEDICARE

## 2025-05-23 VITALS — WEIGHT: 163 LBS | HEIGHT: 69 IN | BODY MASS INDEX: 24.14 KG/M2

## 2025-05-23 DIAGNOSIS — M77.8 OTHER ENTHESOPATHIES, NOT ELSEWHERE CLASSIFIED: ICD-10-CM

## 2025-05-23 DIAGNOSIS — M25.539 PAIN IN UNSPECIFIED WRIST: ICD-10-CM

## 2025-05-23 PROCEDURE — 20550 NJX 1 TENDON SHEATH/LIGAMENT: CPT | Mod: RT

## 2025-05-23 PROCEDURE — 99213 OFFICE O/P EST LOW 20 MIN: CPT | Mod: 25

## 2025-07-15 ENCOUNTER — APPOINTMENT (OUTPATIENT)
Dept: UROLOGY | Facility: CLINIC | Age: 70
End: 2025-07-15
Payer: MEDICARE

## 2025-07-15 VITALS
BODY MASS INDEX: 24.14 KG/M2 | SYSTOLIC BLOOD PRESSURE: 151 MMHG | OXYGEN SATURATION: 96 % | HEIGHT: 69 IN | WEIGHT: 163 LBS | DIASTOLIC BLOOD PRESSURE: 90 MMHG | HEART RATE: 92 BPM

## 2025-07-15 PROCEDURE — 99213 OFFICE O/P EST LOW 20 MIN: CPT

## 2025-07-21 ENCOUNTER — APPOINTMENT (OUTPATIENT)
Dept: ORTHOPEDIC SURGERY | Facility: CLINIC | Age: 70
End: 2025-07-21
Payer: MEDICARE

## 2025-07-21 DIAGNOSIS — M77.8 OTHER ENTHESOPATHIES, NOT ELSEWHERE CLASSIFIED: ICD-10-CM

## 2025-07-21 PROCEDURE — 20550 NJX 1 TENDON SHEATH/LIGAMENT: CPT

## 2025-07-21 PROCEDURE — 99213 OFFICE O/P EST LOW 20 MIN: CPT | Mod: 25
